# Patient Record
Sex: FEMALE | Race: WHITE | URBAN - METROPOLITAN AREA
[De-identification: names, ages, dates, MRNs, and addresses within clinical notes are randomized per-mention and may not be internally consistent; named-entity substitution may affect disease eponyms.]

---

## 2022-05-12 ENCOUNTER — HOSPITAL ENCOUNTER (OUTPATIENT)
Dept: PHYSICAL THERAPY | Age: 52
Discharge: HOME | End: 2022-05-12
Payer: COMMERCIAL

## 2022-05-12 PROCEDURE — 97161 PT EVAL LOW COMPLEX 20 MIN: CPT | Performed by: PHYSICAL THERAPIST

## 2022-05-12 PROCEDURE — 97535 SELF CARE MNGMENT TRAINING: CPT | Performed by: PHYSICAL THERAPIST

## 2022-05-12 NOTE — PROGRESS NOTES
RICARDO [address], [address] [address],[facility], [facility] - Phone: [phone]  Fax: [phone] / 9881 [facility]  Patient Name: Oliverio Dumont : 1970   Medical   Diagnosis: S/p ankle surgery Treatment Diagnosis: Right ankle pain [M25.571]   Onset Date: 22     Referral Source: Carla Sawant MD [place] [facility]): 2022   Prior Hospitalization: See medical history Provider #: [id number]   Prior Level of Function: Pain with all wb   Comorbidities: Depression, asthma, spondylolisthesis, thyroid issues   Medications: Verified on Patient Summary List   The Plan of Care and following information is based on the information from the initial evaluation.   ===========================================================================================  Assessment / key information:  46 F referred to therapy following lateral stabilization surgery on 22. I did not have access to operative report and have requested it from your office. She had a chronic hx of lateral ankle sprains and opted for surgery. 2 weeks in splint, 3 weeks in cast, 3 weeks in cam boot. Currently in aso brace wbat. Rats pain 0-8/10 increasing with wb. Arom: df knee ext -6, flex -10, pf 44, iv 9, ev 6. Strength reduced in all directions (ev 3-/5, all others grossly 3/5). Neurovascular intact. All other special tests deferred due to operation.  Will attempt PT per protocol.  ===========================================================================================  Eval Complexity: History MEDIUM  Complexity : 1-2 comorbidities / personal factors will impact the outcome/ POC ;  Examination  HIGH Complexity : 4+ Standardized tests and measures addressing body structure, function, activity limitation and / or participation in recreation ; Presentation LOW Complexity : Stable, uncomplicated ;  Decision Making MEDIUM Complexity : FOTO score of 26-74; Overall Complexity LOW   Problem List: pain affecting function, decrease ROM, decrease strength, edema affecting function, impaired gait/ balance, decrease ADL/ functional abilitiies, decrease activity tolerance, decrease flexibility/ joint mobility and decrease transfer abilities   Treatment Plan may include any combination of the following: Therapeutic exercise, Therapeutic activities, Neuromuscular re-education, Physical agent/modality, Gait/balance training, Manual therapy, Patient education, Self Care training, Functional mobility training, Home safety training and Stair training  Patient / Family readiness to learn indicated by: asking questions, trying to perform skills and interest  Persons(s) to be included in education: patient (P)  Barriers to Learning/Limitations: yes; Physical   Measures taken, if barriers to learning: Modify treatment due to back pain   Patient Goal (s): Improve strength/stabiliyty   Patient self reported health status: good  Rehabilitation Potential: good   Short Term Goals: To be accomplished in  2  weeks:  1. Compliant with hep  2. Able to perform sls >/=20 without ev in order to improve stability during standing  3. Note daily max pain </=6/10 in order to increase participation in [address]: To be accomplished in  4  weeks:  1. Able to perform 3x15 eccentric hr in order to improve strength/push off during gait  2. Note daily max pain </=4/10 in order to increase participation in adls  3.  Increase foto by 3 points in order to show functional improvement   Frequency / Duration:   Patient to be seen  2-3  times per week for 4  weeks:  Patient / Caregiver education and instruction: self care, activity modification and brace/ splint application  Therapist Signature: William Birch PT Date: 2/50/0005   Certification Period: na Time: 1:55 PM ===========================================================================================  I certify that the above Physical Therapy Services are being furnished while the patient is under my care. I agree with the treatment plan and certify that this therapy is necessary. Physician Signature:        Date:       Time:                                        Fabrice Genao MD  Please sign and return to [facility] at [facility], [facility]. or you may fax the signed copy to [phone]. Thank you.

## 2022-05-13 ENCOUNTER — HOSPITAL ENCOUNTER (OUTPATIENT)
Dept: PHYSICAL THERAPY | Age: 52
Discharge: HOME | End: 2022-05-13
Payer: COMMERCIAL

## 2022-05-13 PROCEDURE — 97110 THERAPEUTIC EXERCISES: CPT | Performed by: PHYSICAL THERAPIST

## 2022-05-13 PROCEDURE — 97140 MANUAL THERAPY 1/> REGIONS: CPT | Performed by: PHYSICAL THERAPIST

## 2022-05-13 NOTE — PROGRESS NOTES
Belén Calvillo   PHYSICAL THERAPY - DAILY TREATMENT NOTE    Patient Name: Katerine Childers        Date: 2022  : 1970   yes Patient  Verified  Visit #:     Insurance: Payor: BLUE CROSS / Plan: [address] [place] [address] / Product Type: PPO /      In time: 1025 Out time: 1115   Total Treatment Time: 50     Medicare/[facility] Time Tracking (below)   Total Timed Codes (min):  40 1:1 Treatment Time:  40     TREATMENT AREA =  Right ankle pain [M25.571]    SUBJECTIVE  Pain Level (on 0 to 10 scale):  0  / 10   Medication Changes/New allergies or changes in medical history, any new surgeries or procedures?    no  If yes, update Summary List   Subjective Functional Status/Changes:  []  No changes reported     Nothing new since the evaluation           OBJECTIVE  Modalities Rationale:     decrease inflammation, decrease pain and increase tissue extensibility to improve patient's ability to complete adls   min [] Estim, type/location:                                      []  att     []  unatt     []  w/US     []  w/ice    []  w/heat    min []  Mechanical Traction: type/lbs                   []  pro   []  sup   []  int   []  cont    []  before manual    []  after manual    min []  Ultrasound, settings/location:      min []  Iontophoresis w/ dexamethasone, location:                                               []  take home patch       []  in clinic   10 min [x]  Ice     []  Heat    location/position: Supine with bolster     min []  Vasopneumatic Device, press/temp:    If using vaso (only need to measure limb vaso being performed on)      pre-treatment girth :       post-treatment girth :       measured at (landmark location) :      min []  Other:    [] Skin assessment post-treatment (if applicable):    []  intact    []  redness- no adverse reaction                  []redness - adverse reaction:      15 min Therapeutic Exercise:  [x]  See flow sheet   Rationale:      increase ROM, increase strength, improve coordination, improve balance and increase proprioception to improve the patients ability to complete adls     25 min Manual Therapy: Stm/stretch gastroc/soleus, pf, hallux dorsal/plantar glides   Rationale:      decrease pain, increase ROM, increase tissue extensibility, decrease edema  and decrease trigger points to improve patient's ability to complete adls  The manual therapy interventions were performed at a separate and distinct time from the therapeutic activities interventions. Billed With/As:   [] TE   [] TA   [] Neuro   [] Self Care Patient Education: [x] Review HEP    [] Progressed/Changed HEP based on:   [] positioning   [] body mechanics   [] transfers   [] heat/ice application    [] other:      Other Objective/Functional Measures:    Unable to obtain surgical from multiple attempts by pt and therapist  te as per flow sheet with fatigue noted during last 8-10 reps of hr (all performed in shoe outside of brace)  ttp along longitudinal arch     Post Treatment Pain Level (on 0 to 10) scale:   0  / 10     ASSESSMENT  Assessment/Changes in Function:     No increase in pain following initial session but advised on doms      []  See Progress Note/Recertification   Patient will continue to benefit from skilled PT services to modify and progress therapeutic interventions, address functional mobility deficits, address ROM deficits, address strength deficits, analyze and address soft tissue restrictions, analyze and cue movement patterns, analyze and modify body mechanics/ergonomics, assess and modify postural abnormalities, address imbalance/dizziness and instruct in home and community integration to attain remaining goals.    Progress toward goals / Updated goals:    Compliant with footwear modification      PLAN  []  Upgrade activities as tolerated yes Continue plan of care   []  Discharge due to :    []  Other:      Therapist: Vani Montesinos PT    Date: 5/13/2022 Time: 9:23 AM     Future Appointments Date Time Provider Ki Rudd   5/13/2022 10:15 AM Joeanye Recinos, PT [facility]   6/7/2022  1:45 PM Drea Espinosamark [facility]   6/10/2022  9:15 AM Joe Recinos, PT [facility]   6/13/2022 12:00 PM Joe Recinos, PT [facility]   6/15/2022 12:30 PM Joe Passe, PT [facility]   6/20/2022 12:00 PM Joe Grisel, PT [facility]   6/22/2022 12:30 PM Domo Jeffers, PT [address] [id number]

## 2022-05-16 ENCOUNTER — HOSPITAL ENCOUNTER (OUTPATIENT)
Dept: PHYSICAL THERAPY | Age: 52
Discharge: HOME | End: 2022-05-16
Payer: COMMERCIAL

## 2022-05-16 PROCEDURE — 97110 THERAPEUTIC EXERCISES: CPT | Performed by: PHYSICAL THERAPIST

## 2022-05-16 PROCEDURE — 97140 MANUAL THERAPY 1/> REGIONS: CPT | Performed by: PHYSICAL THERAPIST

## 2022-05-16 NOTE — PROGRESS NOTES
Yusuf Herbert   PHYSICAL THERAPY - DAILY TREATMENT NOTE    Patient Name: Shanna Stevens        Date: 2022  : 1970   yes Patient  Verified  Visit #:   3   of   12  Insurance: Payor: Holden Magnoliabobo / Plan: [address] [place] [address] / Product Type: PPO /      In time: 7920 Out time:    Total Treatment Time: 52     Medicare/[employer] Time Tracking (below)   Total Timed Codes (min):  42 1:1 Treatment Time:  42     TREATMENT AREA =  Right ankle pain [M25.571]    SUBJECTIVE  Pain Level (on 0 to 10 scale):  1  / 10   Medication Changes/New allergies or changes in medical history, any new surgeries or procedures?    no  If yes, update Summary List   Subjective Functional Status/Changes:  []  No changes reported     Not too bad after last time I thought that it was going to be painful       OBJECTIVE  Modalities Rationale:     decrease inflammation, decrease pain and increase tissue extensibility to improve patient's ability to complete adls   min [] Estim, type/location:                                      []  att     []  unatt     []  w/US     []  w/ice    []  w/heat    min []  Mechanical Traction: type/lbs                   []  pro   []  sup   []  int   []  cont    []  before manual    []  after manual    min []  Ultrasound, settings/location:      min []  Iontophoresis w/ dexamethasone, location:                                               []  take home patch       []  in clinic   10 min [x]  Ice     []  Heat    location/position: Supine with bolster     min []  Vasopneumatic Device, press/temp:    If using vaso (only need to measure limb vaso being performed on)      pre-treatment girth :       post-treatment girth :       measured at (landmark location) :      min []  Other:    [] Skin assessment post-treatment (if applicable):    []  intact    []  redness- no adverse reaction                  []redness - adverse reaction:      17 min Therapeutic Exercise:  [x]  See flow sheet   Rationale:      increase ROM, increase strength, improve coordination, improve balance and increase proprioception to improve the patients ability to complete adls     25 min Manual Therapy: Stm/stretch gastroc/soleus, pf, hallux dorsal/plantar glides   Rationale:      decrease pain, increase ROM, increase tissue extensibility, decrease edema  and decrease trigger points to improve patient's ability to complete adls  The manual therapy interventions were performed at a separate and distinct time from the therapeutic activities interventions. Billed With/As:   [] TE   [] TA   [] Neuro   [] Self Care Patient Education: [x] Review HEP    [] Progressed/Changed HEP based on:   [] positioning   [] body mechanics   [] transfers   [] heat/ice application    [] other:      Other Objective/Functional Measures:  te as per flow sheet - able to achieve full range with HR with use of UE   stj edema noted but no pain reported with prom df       Post Treatment Pain Level (on 0 to 10) scale:   0  / 10     ASSESSMENT  Assessment/Changes in Function:     No increase in pain following session      []  See Progress Note/Recertification   Patient will continue to benefit from skilled PT services to modify and progress therapeutic interventions, address functional mobility deficits, address ROM deficits, address strength deficits, analyze and address soft tissue restrictions, analyze and cue movement patterns, analyze and modify body mechanics/ergonomics, assess and modify postural abnormalities, address imbalance/dizziness and instruct in home and community integration to attain remaining goals.    Progress toward goals / Updated goals:    Compliant with hep/activity modification     PLAN  []  Upgrade activities as tolerated yes Continue plan of care   []  Discharge due to :    []  Other:      Therapist: Timmy De Anda PT    Date: 5/16/2022 Time: 9:23 AM     Future Appointments   Date Time Provider Ki Rudd   5/16/2022 12:00 PM Matt Jeffers, PT [address] [facility]   5/18/2022 11:00 AM Kei Carmona, PT [facility]   6/7/2022  1:45 PM Nahed Turner [address] [facility]   6/10/2022  9:15 AM Kei Carmona, PT [facility]   6/13/2022 12:00 PM Kei Carmona, PT [facility]   6/15/2022 12:30 PM Kei Carmona,  [address] [facility]   6/20/2022 12:00 PM Kei Carmona, PT [facility]   6/22/2022 12:30 PM Ramon Jeffers, PT [facility] [id number]

## 2022-05-18 ENCOUNTER — HOSPITAL ENCOUNTER (OUTPATIENT)
Dept: PHYSICAL THERAPY | Age: 52
Discharge: HOME | End: 2022-05-18
Payer: COMMERCIAL

## 2022-05-18 PROCEDURE — 97140 MANUAL THERAPY 1/> REGIONS: CPT | Performed by: PHYSICAL THERAPIST

## 2022-05-18 PROCEDURE — 97110 THERAPEUTIC EXERCISES: CPT | Performed by: PHYSICAL THERAPIST

## 2022-05-18 NOTE — PROGRESS NOTES
Ginny Washington   PHYSICAL THERAPY - DAILY TREATMENT NOTE    Patient Name: Jamel Knight        Date: 2022  : 1970   yes Patient  Verified  Visit #:     Insurance: Payor: Jonathan Gonzalez / Plan: [address] [place] [address] / Product Type: PPO /      In time: 1240 Out time: 138   Total Treatment Time: 58     Medicare/[facility] Time Tracking (below)   Total Timed Codes (min):  48 1:1 Treatment Time:  48     TREATMENT AREA =  Right ankle pain [M25.571]    SUBJECTIVE  Pain Level (on 0 to 10 scale):  1  / 10   Medication Changes/New allergies or changes in medical history, any new surgeries or procedures?    no  If yes, update Summary List   Subjective Functional Status/Changes:  []  No changes reported   I got a little soreness on the outside part of my ankle/foot when I was walking yesterday but nothingbad      OBJECTIVE  Modalities Rationale:     decrease inflammation, decrease pain and increase tissue extensibility to improve patient's ability to complete adls   min [] Estim, type/location:                                      []  att     []  unatt     []  w/US     []  w/ice    []  w/heat    min []  Mechanical Traction: type/lbs                   []  pro   []  sup   []  int   []  cont    []  before manual    []  after manual    min []  Ultrasound, settings/location:      min []  Iontophoresis w/ dexamethasone, location:                                               []  take home patch       []  in clinic   10 min [x]  Ice     []  Heat    location/position: Supine with bolster     min []  Vasopneumatic Device, press/temp:    If using vaso (only need to measure limb vaso being performed on)      pre-treatment girth :       post-treatment girth :       measured at (landmark location) :      min []  Other:    [] Skin assessment post-treatment (if applicable):    []  intact    []  redness- no adverse reaction                  []redness - adverse reaction:      23 min Therapeutic Exercise:  [x]  See flow sheet Rationale:      increase ROM, increase strength, improve coordination, improve balance and increase proprioception to improve the patients ability to complete adls     25 min Manual Therapy: Stm/stretch gastroc/soleus, pf, hallux dorsal/plantar glides   Rationale:      decrease pain, increase ROM, increase tissue extensibility, decrease edema  and decrease trigger points to improve patient's ability to complete adls  The manual therapy interventions were performed at a separate and distinct time from the therapeutic activities interventions. Billed With/As:   [] TE   [] TA   [] Neuro   [] Self Care Patient Education: [x] Review HEP    [] Progressed/Changed HEP based on:   [] positioning   [] body mechanics   [] transfers   [] heat/ice application    [] other:      Other Objective/Functional Measures:  Progressed te as per flow sheet - no pain reported with eccentric hr off step but fatigue  ttp with hallux df >25       Post Treatment Pain Level (on 0 to 10) scale:   0  / 10     ASSESSMENT  Assessment/Changes in Function:     Left asymptomatic    []  See Progress Note/Recertification   Patient will continue to benefit from skilled PT services to modify and progress therapeutic interventions, address functional mobility deficits, address ROM deficits, address strength deficits, analyze and address soft tissue restrictions, analyze and cue movement patterns, analyze and modify body mechanics/ergonomics, assess and modify postural abnormalities, address imbalance/dizziness and instruct in home and community integration to attain remaining goals.    Progress toward goals / Updated goals:  Pt to go out of town x1 week will assess compliance to hep/activity modification      PLAN  []  Upgrade activities as tolerated yes Continue plan of care   []  Discharge due to :    []  Other:      Therapist: Regina Ruiz PT    Date: 5/18/2022 Time: 9:23 AM     Future Appointments   Date Time Provider Ki Rudd 5/18/2022 12:30 PM Heladio Jane, PT [facility]   6/7[facility]2022  [facility]   6/10/2022  9:15 AM Heladio [employer], PT [facility]   6/13/2022 12:00 PM Heladio Jane, PT [facility]   6/15/2022 12:30 PM Heladio Jane, PT [facility]   6/20/2022 12:00 PM Heladio Jane, PT [facility]   6/22/2022 12:30 PM Luis Jeffers, PT [facility] [phone]

## 2022-06-01 ENCOUNTER — HOSPITAL ENCOUNTER (OUTPATIENT)
Dept: PHYSICAL THERAPY | Age: 52
Discharge: HOME | End: 2022-06-01
Payer: COMMERCIAL

## 2022-06-01 PROCEDURE — 97140 MANUAL THERAPY 1/> REGIONS: CPT | Performed by: PHYSICAL THERAPIST

## 2022-06-01 PROCEDURE — 97110 THERAPEUTIC EXERCISES: CPT | Performed by: PHYSICAL THERAPIST

## 2022-06-01 NOTE — PROGRESS NOTES
Jaki Barlow PHYSICAL THERAPY - DAILY TREATMENT NOTE    Patient Name: Bryn Rodriguez        Date: 2022  : 1970   yes Patient  Verified  Visit #:     Insurance: Payor: Jon Face / Plan: [address] [place] [address] / Product Type: PPO /      In time: 400 Out time: 452   Total Treatment Time: 52     Medicare/[employer]BS Time Tracking (below)   Total Timed Codes (min):  42 1:1 Treatment Time:  42     TREATMENT AREA =  Right ankle pain [M25.571]    SUBJECTIVE  Pain Level (on 0 to 10 scale):  0/ 10   Medication Changes/New allergies or changes in medical history, any new surgeries or procedures?    no  If yes, update Summary List   Subjective Functional Status/Changes:  []  No changes reported     Just feels stiff.  I did a lot of walking in my brace on sand but it was okay      OBJECTIVE  Modalities Rationale:     decrease inflammation, decrease pain and increase tissue extensibility to improve patient's ability to complete adls   min [] Estim, type/location:                                      []  att     []  unatt     []  w/US     []  w/ice    []  w/heat    min []  Mechanical Traction: type/lbs                   []  pro   []  sup   []  int   []  cont    []  before manual    []  after manual    min []  Ultrasound, settings/location:      min []  Iontophoresis w/ dexamethasone, location:                                               []  take home patch       []  in clinic   10 min [x]  Ice     []  Heat    location/position: Supine with bolster     min []  Vasopneumatic Device, press/temp:    If using vaso (only need to measure limb vaso being performed on)      pre-treatment girth :       post-treatment girth :       measured at (landmark location) :      min []  Other:    [] Skin assessment post-treatment (if applicable):    []  intact    []  redness- no adverse reaction                  []redness - adverse reaction:      17 min Therapeutic Exercise:  [x]  See flow sheet   Rationale:      increase ROM, increase strength, improve coordination, improve balance and increase proprioception to improve the patients ability to complete adls     25 min Manual Therapy: Stm/stretch gastroc/soleus, pf, hallux dorsal/plantar glides   Rationale:      decrease pain, increase ROM, increase tissue extensibility, decrease edema  and decrease trigger points to improve patient's ability to complete adls  The manual therapy interventions were performed at a separate and distinct time from the therapeutic activities interventions. Billed With/As:   [] TE   [] TA   [] Neuro   [] Self Care Patient Education: [x] Review HEP    [] Progressed/Changed HEP based on:   [] positioning   [] body mechanics   [] transfers   [] heat/ice application    [] other:      Other Objective/Functional Measures:  Visible edema along tarsal tunnel but not mid foot  Progressed standing te as per flow sheet without brace - multiple finger tip correction required for balance        Post Treatment Pain Level (on 0 to 10) scale:   0  / 10     ASSESSMENT  Assessment/Changes in Function:   No adverse reactions following session      []  See Progress Note/Recertification   Patient will continue to benefit from skilled PT services to modify and progress therapeutic interventions, address functional mobility deficits, address ROM deficits, address strength deficits, analyze and address soft tissue restrictions, analyze and cue movement patterns, analyze and modify body mechanics/ergonomics, assess and modify postural abnormalities, address imbalance/dizziness and instruct in home and community integration to attain remaining goals.    Progress toward goals / Updated goals:  FOTO tomorrow to assess progress towards functional goals      PLAN  []  Upgrade activities as tolerated yes Continue plan of care   []  Discharge due to :    []  Other:      Therapist: Tali Carrasco PT    Date: 6/1/2022 Time: 9:23 AM     Future Appointments   Date Time Provider Department [place]   6/1/2022  3:45 PM Vale Elliott, PT [facility]   6/7/2022  1:45 PM Gianfrancokevin Villafana [facility]   6/10/2022  9:15 AM Vale Elliott, PT [facility]   6/13/2022 12:00 PM Vale Elliott, PT [facility]   6/15/2022 12:30 PM Vale Elliott, PT [facility]   6/20/2022 12:00 PM Vale Elliott, PT [facility]   6/22/2022 12:30 PM Mare Jeffers, PT [address] [phone]

## 2022-06-02 ENCOUNTER — APPOINTMENT (OUTPATIENT)
Dept: PHYSICAL THERAPY | Age: 52
End: 2022-06-02
Payer: COMMERCIAL

## 2022-06-07 ENCOUNTER — HOSPITAL ENCOUNTER (OUTPATIENT)
Dept: PHYSICAL THERAPY | Age: 52
Discharge: HOME | End: 2022-06-07
Payer: COMMERCIAL

## 2022-06-07 PROCEDURE — 97140 MANUAL THERAPY 1/> REGIONS: CPT | Performed by: PHYSICAL THERAPIST

## 2022-06-07 PROCEDURE — 97110 THERAPEUTIC EXERCISES: CPT | Performed by: PHYSICAL THERAPIST

## 2022-06-07 NOTE — PROGRESS NOTES
[facility] PHYSICAL THERAPY - DAILY TREATMENT NOTE    Patient Name: Edmund Villalobos        Date: 2022  : 1970   yes Patient  Verified  Visit #:     Insurance: Payor: Nikolai Eldridge / Plan: [address] [place] [address] / Product Type: PPO /      In time: 145 Out time: 150   Total Treatment Time: 60     Medicare/BCBS Time Tracking (below)   Total Timed Codes (min):  50 1:1 Treatment Time:  50     TREATMENT AREA =  Right ankle pain [M25.571]    SUBJECTIVE  Pain Level (on 0 to 10 scale):  4/ 10   Medication Changes/New allergies or changes in medical history, any new surgeries or procedures?    no  If yes, update Summary List   Subjective Functional Status/Changes:  []  No changes reported     Saw the doctor and he said it was okay to get out of the brace so I did - after 3 days without it my leg swelled and has been swollen since.  I iced one time but that did not help     OBJECTIVE  Modalities Rationale:     decrease inflammation, decrease pain and increase tissue extensibility to improve patient's ability to complete adls   min [] Estim, type/location:                                      []  att     []  unatt     []  w/US     []  w/ice    []  w/heat    min []  Mechanical Traction: type/lbs                   []  pro   []  sup   []  int   []  cont    []  before manual    []  after manual    min []  Ultrasound, settings/location:      min []  Iontophoresis w/ dexamethasone, location:                                               []  take home patch       []  in clinic   10 min [x]  Ice     []  Heat    location/position: Supine with bolster     min []  Vasopneumatic Device, press/temp:    If using vaso (only need to measure limb vaso being performed on)      pre-treatment girth :       post-treatment girth :       measured at (landmark location) :      min []  Other:    [] Skin assessment post-treatment (if applicable):    []  intact    []  redness- no adverse reaction                  []redness - adverse reaction:      25 min Therapeutic Exercise:  [x]  See flow sheet   Rationale:      increase ROM, increase strength, improve coordination, improve balance and increase proprioception to improve the patients ability to complete adls     25 min Manual Therapy: Stm/stretch gastroc/soleus, pf, hallux dorsal/plantar glides   Rationale:      decrease pain, increase ROM, increase tissue extensibility, decrease edema  and decrease trigger points to improve patient's ability to complete adls  The manual therapy interventions were performed at a separate and distinct time from the therapeutic activities interventions. Billed With/As:   [] TE   [] TA   [] Neuro   [] Self Care Patient Education: [x] Review HEP    [] Progressed/Changed HEP based on:   [] positioning   [] body mechanics   [] transfers   [] heat/ice application    [] other:      Other Objective/Functional Measures:  Arrived to session with increased edema throughout entire foot/ankle complex with stj impingement end range df - this is new. Modified session accordingly        Post Treatment Pain Level (on 0 to 10) scale:   0  / 10     ASSESSMENT  Assessment/Changes in Function:   Left session without pain but did not progress original poc of pres     []  See Progress Note/Recertification   Patient will continue to benefit from skilled PT services to modify and progress therapeutic interventions, address functional mobility deficits, address ROM deficits, address strength deficits, analyze and address soft tissue restrictions, analyze and cue movement patterns, analyze and modify body mechanics/ergonomics, assess and modify postural abnormalities, address imbalance/dizziness and instruct in home and community integration to attain remaining goals.    Progress toward goals / Updated goals:  FOTO decreased but likely due to subjective intake      PLAN  []  Upgrade activities as tolerated yes Continue plan of care   []  Discharge due to :    []  Other: Therapist: Brayan Murdock PT    Date: 6/7/2022 Time: 9:23 AM     Future Appointments   Date Time Provider Ki Rudd   6/7/2022  1:45 PM Lorna Olivares [facility]   6/10/2022  9:15 AM Baljeet Buck, PT [facility]   6/13/2022 12:00 PM Baljeet Buck, PT [facility]   6/15/2022 12:30 PM Baljeet Buck, PT [facility]   6/20/2022 12:00 PM [facility]et Buck [facility]   6/22/2022 12:30 PM Ghazala Jeffers, PT [facility] [phone]

## 2022-06-10 ENCOUNTER — HOSPITAL ENCOUNTER (OUTPATIENT)
Dept: PHYSICAL THERAPY | Age: 52
Discharge: HOME | End: 2022-06-10
Payer: COMMERCIAL

## 2022-06-10 PROCEDURE — 97140 MANUAL THERAPY 1/> REGIONS: CPT | Performed by: PHYSICAL THERAPIST

## 2022-06-10 PROCEDURE — 97110 THERAPEUTIC EXERCISES: CPT | Performed by: PHYSICAL THERAPIST

## 2022-06-10 NOTE — PROGRESS NOTES
[facility]   PHYSICAL THERAPY - DAILY TREATMENT NOTE    Patient Name: Bandar Castrejon        Date: 6/10/2022  : 1970   yes Patient  Verified  Visit #:     Insurance: Payor: Yohana Des / Plan: [address] [place] [address] / Product Type: PPO /      In time: 1006 Out time: 110   Total Treatment Time: 64     Medicare/BCBS Time Tracking (below)   Total Timed Codes (min):  54 1:1 Treatment Time:  54     TREATMENT AREA =  Right ankle pain [M25.571]    SUBJECTIVE  Pain Level (on 0 to 10 scale):  4/ 10   Medication Changes/New allergies or changes in medical history, any new surgeries or procedures?    no  If yes, update Summary List   Subjective Functional Status/Changes:  []  No changes reported   Feels better      OBJECTIVE  Modalities Rationale:     decrease inflammation, decrease pain and increase tissue extensibility to improve patient's ability to complete adls   min [] Estim, type/location:                                      []  att     []  unatt     []  w/US     []  w/ice    []  w/heat    min []  Mechanical Traction: type/lbs                   []  pro   []  sup   []  int   []  cont    []  before manual    []  after manual    min []  Ultrasound, settings/location:      min []  Iontophoresis w/ dexamethasone, location:                                               []  take home patch       []  in clinic   10 min [x]  Ice     []  Heat    location/position: Supine with bolster     min []  Vasopneumatic Device, press/temp:    If using vaso (only need to measure limb vaso being performed on)      pre-treatment girth :       post-treatment girth :       measured at (landmark location) :      min []  Other:    [] Skin assessment post-treatment (if applicable):    []  intact    []  redness- no adverse reaction                  []redness - adverse reaction:      24 min Therapeutic Exercise:  [x]  See flow sheet   Rationale:      increase ROM, increase strength, improve coordination, improve balance and increase proprioception to improve the patients ability to complete adls     20 min Manual Therapy: Stm/stretch gastroc/soleus, pf, hallux dorsal/plantar glides   Rationale:      decrease pain, increase ROM, increase tissue extensibility, decrease edema  and decrease trigger points to improve patient's ability to complete adls  The manual therapy interventions were performed at a separate and distinct time from the therapeutic activities interventions. Billed With/As:   [] TE   [] TA   [] Neuro   [] Self Care Patient Education: [x] Review HEP    [] Progressed/Changed HEP based on:   [] positioning   [] body mechanics   [] transfers   [] heat/ice application    [] other:      Other Objective/Functional Measures:    ttp along anterior stj  Able to perform te as per flow sheet      Post Treatment Pain Level (on 0 to 10) scale:   0  / 10     ASSESSMENT  Assessment/Changes in Function:   No adverse reactions for following session    []  See Progress Note/Recertification   Patient will continue to benefit from skilled PT services to modify and progress therapeutic interventions, address functional mobility deficits, address ROM deficits, address strength deficits, analyze and address soft tissue restrictions, analyze and cue movement patterns, analyze and modify body mechanics/ergonomics, assess and modify postural abnormalities, address imbalance/dizziness and instruct in home and community integration to attain remaining goals.    Progress toward goals / Updated goals  Slowly progressing weaning out of any ankle support      PLAN  []  Upgrade activities as tolerated yes Continue plan of care   []  Discharge due to :    []  Other:      Therapist: Eduard Doty PT    Date: 6/10/2022 Time: 9:23 AM     Future Appointments   Date Time Provider Ki Rudd   6/10/2022 10:00 AM Kristie Rincon  [address] [facility]   6/13/2022 12:00 PM Leah Nazario [facility]   6/15/2022 12:30 PM Kristie Rincon  [address] [facility]   6/20/2022 12:00 PM Justine Mcclendon,  [address] [facility]   6/22/2022 12:30 PM Gary Jeffers, PT [facility]

## 2022-06-13 ENCOUNTER — HOSPITAL ENCOUNTER (OUTPATIENT)
Dept: PHYSICAL THERAPY | Age: 52
Discharge: HOME | End: 2022-06-13
Payer: COMMERCIAL

## 2022-06-13 PROCEDURE — 97110 THERAPEUTIC EXERCISES: CPT | Performed by: PHYSICAL THERAPIST

## 2022-06-13 PROCEDURE — 97140 MANUAL THERAPY 1/> REGIONS: CPT | Performed by: PHYSICAL THERAPIST

## 2022-06-13 NOTE — PROGRESS NOTES
.RAD[facility] THER[facility]  [address] [facility] [address],[facility], [facility] - Phone: [phone]  Fax: [phone]  PROGRESS NOTE  Patient Name: Griselda Krone : 1970   Treatment/Medical Diagnosis: Right ankle pain [M25.571]   Referral Source: Elias Bonilla MD     Date of Initial Visit: 22 Attended Visits: 8 Missed Visits: 2     SUMMARY OF TREATMENT  Pt was seen for IE and 7 follow up visits with treatment consisting of therapeutic exercises, nmr, manual therapy and instruction on hep/activity modification  CURRENT STATUS  Pt has made good progress with PT thus far. She has restored full pain free arom in all directions. She notes some lateral ankle pain at end of day or with walking >/=15minutes '. Some effusion is associated with this but all resolve with rest. We have been slow to progress strengthening based on protocol. Would like to continue therapy an additional month to address strength/stabilization    Goal/Measure of Progress Goal Met? 1.  Able to perform 3x15 eccentric hr in order to improve push off during gait   Status at last Eval: unable Current Status: Double leg  yes   2. Note daily max pain </=4/10 in order to increase participation in adls   Status at last Eval: 10/10 Current Status: 4/10 yes   3. Increase FOTO by 3 points in order to show functional improvement   Status at last Eval: 63/100 Current Status: 60/100 no     New Goals to be achieved in __4__  weeks: 1. Able to perform 3x15 eccentric SINGLE LEG hr in order to improve push off during gait  2. Pt will ambulate >/=30 minutes without progressive pain in order to complete adls such as grocery shopping  3. Achieve goal #3  RECOMMENDATIONS  Continue therapy 2x/4 weeks   If you have any questions/comments please contact us directly at [phone]. Thank you for allowing us to assist in the care of your patient.     Therapist Signature: Wing Glass, PT Date: 2022 Time: 7:55 AM   NOTE TO PHYSICIAN:  PLEASE COMPLETE THE ORDERS BELOW AND FAX TO   [facility]: [phone]  If you are unable to process this request in 24 hours please contact our office: [phone]    ___ I have read the above report and request that my patient continue as recommended.   ___ I have read the above report and request that my patient continue therapy with the following changes/special instructions:_________________________________________________________   ___ I have read the above report and request that my patient be discharged from therapy.      Physician Signature:        Date:       Time:                                 Wilfredo Thompson MD

## 2022-06-13 NOTE — PROGRESS NOTES
Maryanne Saini   PHYSICAL THERAPY - DAILY TREATMENT NOTE    Patient Name: Simeon Garcia        Date: 2022  : 1970   yes Patient  Verified  Visit #:     Insurance: Payor: Aditya Saha / Plan: [address] [place] [address] / Product Type: PPO /      In time: 1210 Out time: 100   Total Treatment Time: 50     Medicare/[facility] Time Tracking (below)   Total Timed Codes (min):  40 1:1 Treatment Time:  40     TREATMENT AREA =  Right ankle pain [M25.571]    SUBJECTIVE  Pain Level (on 0 to 10 scale):  4/ 10   Medication Changes/New allergies or changes in medical history, any new surgeries or procedures?    no  If yes, update Summary List   Subjective Functional Status/Changes:  []  No changes reported   See pn     OBJECTIVE  Modalities Rationale:     decrease inflammation, decrease pain and increase tissue extensibility to improve patient's ability to complete adls   min [] Estim, type/location:                                      []  att     []  unatt     []  w/US     []  w/ice    []  w/heat    min []  Mechanical Traction: type/lbs                   []  pro   []  sup   []  int   []  cont    []  before manual    []  after manual    min []  Ultrasound, settings/location:      min []  Iontophoresis w/ dexamethasone, location:                                               []  take home patch       []  in clinic   10 min [x]  Ice     []  Heat    location/position: Supine with bolster     min []  Vasopneumatic Device, press/temp:    If using vaso (only need to measure limb vaso being performed on)      pre-treatment girth :       post-treatment girth :       measured at (landmark location) :      min []  Other:    [] Skin assessment post-treatment (if applicable):    []  intact    []  redness- no adverse reaction                  []redness - adverse reaction:      25 min Therapeutic Exercise:  [x]  See flow sheet   Rationale:      increase ROM, increase strength, improve coordination, improve balance and increase proprioception to improve the patients ability to complete adls     15 min Manual Therapy: Stm/stretch gastroc/soleus, pf, hallux dorsal/plantar glides   Rationale:      decrease pain, increase ROM, increase tissue extensibility, decrease edema  and decrease trigger points to improve patient's ability to complete adls  The manual therapy interventions were performed at a separate and distinct time from the therapeutic activities interventions. Billed With/As:   [] TE   [] TA   [] Neuro   [] Self Care Patient Education: [x] Review HEP    [] Progressed/Changed HEP based on:   [] positioning   [] body mechanics   [] transfers   [] heat/ice application    [] other:      Other Objective/Functional Measures:    See pn     Post Treatment Pain Level (on 0 to 10) scale:   0  / 10     ASSESSMENT  Assessment/Changes in Function:   See pn   []  See Progress Note/Recertification   Patient will continue to benefit from skilled PT services to modify and progress therapeutic interventions, address functional mobility deficits, address ROM deficits, address strength deficits, analyze and address soft tissue restrictions, analyze and cue movement patterns, analyze and modify body mechanics/ergonomics, assess and modify postural abnormalities, address imbalance/dizziness and instruct in home and community integration to attain remaining goals.    Progress toward goals / Updated goals  See pn     PLAN  []  Upgrade activities as tolerated yes Continue plan of care   []  Discharge due to :    []  Other:      Therapist: Brayan Murdock PT    Date: 6/13/2022 Time: 9:23 AM     Future Appointments   Date Time Provider Ki Rudd   6/13/2022 12:00 PM Baljeet Buck PT [facility]   6/15/2022 12:30 PM Baljeet Buck PT [facility]   6/20/2022 12:00 PM Baljeet Buck PT [facility]   6/22/2022 12:30 PM Ghazala Jeffers, PT [facility]

## 2022-06-15 ENCOUNTER — HOSPITAL ENCOUNTER (OUTPATIENT)
Dept: PHYSICAL THERAPY | Age: 52
Discharge: HOME | End: 2022-06-15
Payer: COMMERCIAL

## 2022-06-15 PROCEDURE — 97140 MANUAL THERAPY 1/> REGIONS: CPT | Performed by: PHYSICAL THERAPIST

## 2022-06-15 PROCEDURE — 97110 THERAPEUTIC EXERCISES: CPT | Performed by: PHYSICAL THERAPIST

## 2022-06-15 NOTE — PROGRESS NOTES
[facility]   PHYSICAL THERAPY - DAILY TREATMENT NOTE    Patient Name: Yolis Omalley        Date: 6/15/2022  : 1970   yes Patient  Verified  Visit #:     Insurance: Payor: Low Starr / Plan: [address] [place] [address] / Product Type: PPO /      In time: 690 Out time: 130   Total Treatment Time: 65     Medicare/BCBS Time Tracking (below)   Total Timed Codes (min):  55 1:1 Treatment Time:  55     TREATMENT AREA =  Right ankle pain [M25.571]    SUBJECTIVE  Pain Level (on 0 to 10 scale):  0/ 10   Medication Changes/New allergies or changes in medical history, any new surgeries or procedures?    no  If yes, update Summary List   Subjective Functional Status/Changes:  []  No changes reported   I am feeling really good      OBJECTIVE  Modalities Rationale:     decrease inflammation, decrease pain and increase tissue extensibility to improve patient's ability to complete adls   min [] Estim, type/location:                                      []  att     []  unatt     []  w/US     []  w/ice    []  w/heat    min []  Mechanical Traction: type/lbs                   []  pro   []  sup   []  int   []  cont    []  before manual    []  after manual    min []  Ultrasound, settings/location:      min []  Iontophoresis w/ dexamethasone, location:                                               []  take home patch       []  in clinic   10 min [x]  Ice     []  Heat    location/position: Supine with bolster     min []  Vasopneumatic Device, press/temp:    If using vaso (only need to measure limb vaso being performed on)      pre-treatment girth :       post-treatment girth :       measured at (landmark location) :      min []  Other:    [] Skin assessment post-treatment (if applicable):    []  intact    []  redness- no adverse reaction                  []redness - adverse reaction:      40 min Therapeutic Exercise:  [x]  See flow sheet   Rationale:      increase ROM, increase strength, improve coordination, improve balance and increase proprioception to improve the patients ability to complete adls     15 min Manual Therapy: Stm/stretch gastroc/soleus, pf, hallux dorsal/plantar glides   Rationale:      decrease pain, increase ROM, increase tissue extensibility, decrease edema  and decrease trigger points to improve patient's ability to complete adls  The manual therapy interventions were performed at a separate and distinct time from the therapeutic activities interventions. Billed With/As:   [] TE   [] TA   [] Neuro   [] Self Care Patient Education: [x] Review HEP    [] Progressed/Changed HEP based on:   [] positioning   [] body mechanics   [] transfers   [] heat/ice application    [] other:      Other Objective/Functional Measures:    Progressed te as per flow sheet - able to come to neutral df with squats without stj discomfort     Post Treatment Pain Level (on 0 to 10) scale:   0  / 10     ASSESSMENT  Assessment/Changes in Function:   No adverse reactions following session    []  See Progress Note/Recertification   Patient will continue to benefit from skilled PT services to modify and progress therapeutic interventions, address functional mobility deficits, address ROM deficits, address strength deficits, analyze and address soft tissue restrictions, analyze and cue movement patterns, analyze and modify body mechanics/ergonomics, assess and modify postural abnormalities, address imbalance/dizziness and instruct in home and community integration to attain remaining goals.    Progress toward goals / Updated goals  1 set of single leg hr - progress towards ltg      PLAN  []  Upgrade activities as tolerated yes Continue plan of care   []  Discharge due to :    []  Other:      Therapist: Lisa Sparks PT    Date: 6/15/2022 Time: 9:23 AM     Future Appointments   Date Time Provider Ki Rudd   6/20/2022 12:00 PM Lakesha Acuna PT [facility]   6/22/2022 12:30 PM Lakesha Acuna PT [facility]

## 2022-06-20 ENCOUNTER — HOSPITAL ENCOUNTER (OUTPATIENT)
Dept: PHYSICAL THERAPY | Age: 52
Discharge: HOME | End: 2022-06-20
Payer: COMMERCIAL

## 2022-06-20 PROCEDURE — 97110 THERAPEUTIC EXERCISES: CPT | Performed by: PHYSICAL THERAPIST

## 2022-06-20 PROCEDURE — 97140 MANUAL THERAPY 1/> REGIONS: CPT | Performed by: PHYSICAL THERAPIST

## 2022-06-20 NOTE — PROGRESS NOTES
[facility] PHYSICAL THERAPY - DAILY TREATMENT NOTE    Patient Name: Kelly Joy        Date: 2022  : 1970   yes Patient  Verified  Visit #:   10   of   12  Insurance: Payor: Damián  / Plan: [address] [place] [address] / Product Type: PPO /      In time: 8840 Out time: 115   Total Treatment Time: 70     Medicare/[facility] Time Tracking (below)   Total Timed Codes (min):  60 1:1 Treatment Time:  60     TREATMENT AREA =  Right ankle pain [M25.571]    SUBJECTIVE  Pain Level (on 0 to 10 scale):  1/ 10   Medication Changes/New allergies or changes in medical history, any new surgeries or procedures?    no  If yes, update Summary List   Subjective Functional Status/Changes:  []  No changes reported       I was on my feet all weekend - riding bikes and [facility]       OBJECTIVE  Modalities Rationale:     decrease inflammation, decrease pain and increase tissue extensibility to improve patient's ability to complete adls   min [] Estim, type/location:                                      []  att     []  unatt     []  w/US     []  w/ice    []  w/heat    min []  Mechanical Traction: type/lbs                   []  pro   []  sup   []  int   []  cont    []  before manual    []  after manual    min []  Ultrasound, settings/location:      min []  Iontophoresis w/ dexamethasone, location:                                               []  take home patch       []  in clinic   10 min [x]  Ice     []  Heat    location/position: Supine with bolster     min []  Vasopneumatic Device, press/temp:    If using vaso (only need to measure limb vaso being performed on)      pre-treatment girth :       post-treatment girth :       measured at (landmark location) :      min []  Other:    [] Skin assessment post-treatment (if applicable):    []  intact    []  redness- no adverse reaction                  []redness - adverse reaction:      35 min Therapeutic Exercise:  [x]  See flow sheet   Rationale: increase ROM, increase strength, improve coordination, improve balance and increase proprioception to improve the patients ability to complete adls     25 min Manual Therapy: Stm/stretch gastroc/soleus, pf, hallux dorsal/plantar glides   Rationale:      decrease pain, increase ROM, increase tissue extensibility, decrease edema  and decrease trigger points to improve patient's ability to complete adls  The manual therapy interventions were performed at a separate and distinct time from the therapeutic activities interventions. Billed With/As:   [] TE   [] TA   [] Neuro   [] Self Care Patient Education: [x] Review HEP    [] Progressed/Changed HEP based on:   [] positioning   [] body mechanics   [] transfers   [] heat/ice application    [] other:      Other Objective/Functional Measures:    Significant tenderness along medial achilles and stj compared to previous session, functional df limiting 6\" step downs/stairs likely due to swelling  te as per flow sheet    Post Treatment Pain Level (on 0 to 10) scale:   0  / 10     ASSESSMENT  Assessment/Changes in Function:   Effusion limited full participation in program    []  See Progress Note/Recertification   Patient will continue to benefit from skilled PT services to modify and progress therapeutic interventions, address functional mobility deficits, address ROM deficits, address strength deficits, analyze and address soft tissue restrictions, analyze and cue movement patterns, analyze and modify body mechanics/ergonomics, assess and modify postural abnormalities, address imbalance/dizziness and instruct in home and community integration to attain remaining goals.    Progress toward goals / Updated goals  Prolonged walking especially on uneven surfaces increasing pain     PLAN  []  Upgrade activities as tolerated yes Continue plan of care   []  Discharge due to :    []  Other:      Therapist: Dante Fernandes PT    Date: 6/20/2022 Time: 9:23 AM     Future Appointments   Date Time Provider Ki Rudd   6/20/2022 12:00 PM Bridget Ryder [address] [facility]   6/22/2022 12:30 PM Israel Jeffers, PT [facility] [phone]

## 2022-06-22 ENCOUNTER — HOSPITAL ENCOUNTER (OUTPATIENT)
Dept: PHYSICAL THERAPY | Age: 52
Discharge: HOME | End: 2022-06-22
Payer: COMMERCIAL

## 2022-06-22 PROCEDURE — 97110 THERAPEUTIC EXERCISES: CPT | Performed by: PHYSICAL THERAPIST

## 2022-06-22 PROCEDURE — 97140 MANUAL THERAPY 1/> REGIONS: CPT | Performed by: PHYSICAL THERAPIST

## 2022-06-22 NOTE — PROGRESS NOTES
Marquis Blackburn   PHYSICAL THERAPY - DAILY TREATMENT NOTE    Patient Name: Bandar Castrejon        Date: 2022  : 1970   yes Patient  Verified  Visit #:     Insurance: Payor: BLUE CROSS / Plan: [address] [place] [address] / Product Type: PPO /      In time: 1100 Out time: 1158   Total Treatment Time: 58     Medicare/BCBS Time Tracking (below)   Total Timed Codes (min):  48 1:1 Treatment Time:  48     TREATMENT AREA =  Right ankle pain [M25.571]    SUBJECTIVE  Pain Level (on 0 to 10 scale):  0/ 10   Medication Changes/New allergies or changes in medical history, any new surgeries or procedures?    no  If yes, update Summary List   Subjective Functional Status/Changes:  []  No changes reported   I am feeling better than the other day      OBJECTIVE  Modalities Rationale:     decrease inflammation, decrease pain and increase tissue extensibility to improve patient's ability to complete adls   min [] Estim, type/location:                                      []  att     []  unatt     []  w/US     []  w/ice    []  w/heat    min []  Mechanical Traction: type/lbs                   []  pro   []  sup   []  int   []  cont    []  before manual    []  after manual    min []  Ultrasound, settings/location:      min []  Iontophoresis w/ dexamethasone, location:                                               []  take home patch       []  in clinic   10 min [x]  Ice     []  Heat    location/position: Supine with bolster     min []  Vasopneumatic Device, press/temp:    If using vaso (only need to measure limb vaso being performed on)      pre-treatment girth :       post-treatment girth :       measured at (landmark location) :      min []  Other:    [] Skin assessment post-treatment (if applicable):    []  intact    []  redness- no adverse reaction                  []redness - adverse reaction:      33 min Therapeutic Exercise:  [x]  See flow sheet   Rationale:      increase ROM, increase strength, improve coordination, improve balance and increase proprioception to improve the patients ability to complete adls     15 min Manual Therapy: Stm/stretch gastroc/soleus, pf, hallux dorsal/plantar glides   Rationale:      decrease pain, increase ROM, increase tissue extensibility, decrease edema  and decrease trigger points to improve patient's ability to complete adls  The manual therapy interventions were performed at a separate and distinct time from the therapeutic activities interventions. Billed With/As:   [] TE   [] TA   [] Neuro   [] Self Care Patient Education: [x] Review HEP    [] Progressed/Changed HEP based on:   [] positioning   [] body mechanics   [] transfers   [] heat/ice application    [] other:      Other Objective/Functional Measures:  Reduced pain, tenderness and stj impingement compared to previous session  Progressed te as per flow sheet - fatigued quickly with sl hr and required UE a to achieved    Post Treatment Pain Level (on 0 to 10) scale:   0  / 10     ASSESSMENT  Assessment/Changes in Function:   No increas ein pain following session    []  See Progress Note/Recertification   Patient will continue to benefit from skilled PT services to modify and progress therapeutic interventions, address functional mobility deficits, address ROM deficits, address strength deficits, analyze and address soft tissue restrictions, analyze and cue movement patterns, analyze and modify body mechanics/ergonomics, assess and modify postural abnormalities, address imbalance/dizziness and instruct in home and community integration to attain remaining goals.    Progress toward goals / Updated goals  Progress with hr goal      PLAN  []  Upgrade activities as tolerated yes Continue plan of care   []  Discharge due to :    []  Other:      Therapist: Megan Pinto PT    Date: 6/22/2022 Time: 9:23 AM     Future Appointments   Date Time Provider Ki Rudd   7/1/2022  7:45 AM Heladio Jane PT [facility]   7/6/2022  3:00 PM Robert Moulton, [place] [address] [facility]   7/8/2022  7:00 AM Robert Moulton, PT [facility]   7/13/2022  3:00 PM Sylvester Mesa [address] [facility]   7/15/2022 10:00 AM Robert Moulton, PT [facility]   7/20/2022  2:15 PM Robert Moulton,  [address] [facility]   7/22/2022 10:00 AM Robert Moulton,  [address] [facility]   7/27/2022 12:30 PM Frank Jeffers, PT [facility]   7/29/2022 10:00 AM Glenna Jeffers, PT [address]

## 2022-06-28 ENCOUNTER — HOSPITAL ENCOUNTER (OUTPATIENT)
Dept: PHYSICAL THERAPY | Age: 52
Discharge: HOME | End: 2022-06-28
Payer: COMMERCIAL

## 2022-06-28 PROCEDURE — 97140 MANUAL THERAPY 1/> REGIONS: CPT | Performed by: PHYSICAL THERAPIST

## 2022-06-28 NOTE — PROGRESS NOTES
Dontae Brown   PHYSICAL THERAPY - DAILY TREATMENT NOTE    Patient Name: Mireya Moore        Date: 2022  : 1970   yes Patient  Verified  Visit #:     Insurance: Payor: Philip Rizzo / Plan: [address] [place] [address] / Product Type: PPO /      In time: 840 Out time: 930   Total Treatment Time: 50     Medicare/[facility] Time Tracking (below)   Total Timed Codes (min):  40 1:1 Treatment Time:  40     TREATMENT AREA =  Right ankle pain [M25.571]    SUBJECTIVE  Pain Level (on 0 to 10 scale):  4/ 10   Medication Changes/New allergies or changes in medical history, any new surgeries or procedures?    no  If yes, update Summary List   Subjective Functional Status/Changes:  []  No changes reported   Feel a little swollen I was on my feet a lot      OBJECTIVE  Modalities Rationale:     decrease inflammation, decrease pain and increase tissue extensibility to improve patient's ability to complete adls   min [] Estim, type/location:                                      []  att     []  unatt     []  w/US     []  w/ice    []  w/heat    min []  Mechanical Traction: type/lbs                   []  pro   []  sup   []  int   []  cont    []  before manual    []  after manual    min []  Ultrasound, settings/location:      min []  Iontophoresis w/ dexamethasone, location:                                               []  take home patch       []  in clinic   10 min [x]  Ice     []  Heat    location/position: Supine with bolster     min []  Vasopneumatic Device, press/temp:    If using vaso (only need to measure limb vaso being performed on)      pre-treatment girth :       post-treatment girth :       measured at (landmark location) :      min []  Other:    [] Skin assessment post-treatment (if applicable):    []  intact    []  redness- no adverse reaction                  []redness - adverse reaction:          40 min Manual Therapy: Biomechanical assessment, retro massage Stm/stretch gastroc/soleus, pf, hallux dorsal/plantar glides   Rationale:      decrease pain, increase ROM, increase tissue extensibility, decrease edema  and decrease trigger points to improve patient's ability to complete adls  The manual therapy interventions were performed at a separate and distinct time from the therapeutic activities interventions. Billed With/As:   [] TE   [] TA   [] Neuro   [] Self Care Patient Education: [x] Review HEP    [] Progressed/Changed HEP based on:   [] positioning   [] body mechanics   [] transfers   [] heat/ice application    [] other:      Other Objective/Functional Measures:  Pt arrived to scheduled session with increased edema throughout entire foot/ankle complex - +stj impingement, +tarsal tunnel edema, +talar tilt test. Pt advised to return to [facility] 24-48 hours to reduce edema held on exercises accordingly    Post Treatment Pain Level (on 0 to 10) scale:   0  / 10     ASSESSMENT  Assessment/Changes in Function:   Reduced pain but not edema following session    []  See Progress Note/Recertification   Patient will continue to benefit from skilled PT services to modify and progress therapeutic interventions, address functional mobility deficits, address ROM deficits, address strength deficits, analyze and address soft tissue restrictions, analyze and cue movement patterns, analyze and modify body mechanics/ergonomics, assess and modify postural abnormalities, address imbalance/dizziness and instruct in home and community integration to attain remaining goals.    Progress toward goals / Updated goals  No change from previous session      PLAN  []  Upgrade activities as tolerated yes Continue plan of care   []  Discharge due to :    []  Other:      Therapist: Rogelio Beckham PT    Date: 6/28/2022 Time: 9:23 AM     Future Appointments   Date Time Provider Ki Rudd   6/28/2022  8:45 AM Siria Crystal,  [address] [facility]   7/1/2022  8:30 AM Siria Crystal  [address] [facility]   7/6/2022  3:00 PM Ayad Jeffers, PT [facility]   7/8/2022  7:00 AM Jone Vera, PT [facility]   7/13/2022  3:00 PM Jone Vera, PT [facility]   7/15/2022 10:00 AM Jone Vera, PT [facility]   7/20/2022  2:15 PM Jone Vera, PT [facility]   7/22/2022 10:00 AM Jone Vera, PT [facility]   7/27/2022 12:30 PM [facility]   7/29/2022 10:00 AM Glenna Jeffers, PT [address]

## 2022-07-01 ENCOUNTER — HOSPITAL ENCOUNTER (OUTPATIENT)
Dept: PHYSICAL THERAPY | Age: 52
Discharge: HOME | End: 2022-07-01
Payer: COMMERCIAL

## 2022-07-01 ENCOUNTER — APPOINTMENT (OUTPATIENT)
Dept: PHYSICAL THERAPY | Age: 52
End: 2022-07-01
Payer: COMMERCIAL

## 2022-07-01 PROCEDURE — 97110 THERAPEUTIC EXERCISES: CPT | Performed by: PHYSICAL THERAPIST

## 2022-07-01 PROCEDURE — 97140 MANUAL THERAPY 1/> REGIONS: CPT | Performed by: PHYSICAL THERAPIST

## 2022-07-01 NOTE — PROGRESS NOTES
Nataliya Burns   PHYSICAL THERAPY - DAILY TREATMENT NOTE    Patient Name: Lynn Head        Date: 2022  : 1970   yes Patient  Verified  Visit #:   15   of   18  Insurance: Payor: Jaime Roman / Plan: [address] [place] [address] / Product Type: PPO /      In time: 837 Out time: 940   Total Treatment Time: 58     Medicare/BCBS Time Tracking (below)   Total Timed Codes (min):  48 1:1 Treatment Time:  48     TREATMENT AREA =  Right ankle pain [M25.571]    SUBJECTIVE  Pain Level (on 0 to 10 scale):  0/ 10   Medication Changes/New allergies or changes in medical history, any new surgeries or procedures?    no  If yes, update Summary List   Subjective Functional Status/Changes:  []  No changes reported   Feels much better than it did the other day but my toes still itch     OBJECTIVE  Modalities Rationale:     decrease inflammation, decrease pain and increase tissue extensibility to improve patient's ability to complete adls   min [] Estim, type/location:                                      []  att     []  unatt     []  w/US     []  w/ice    []  w/heat    min []  Mechanical Traction: type/lbs                   []  pro   []  sup   []  int   []  cont    []  before manual    []  after manual    min []  Ultrasound, settings/location:      min []  Iontophoresis w/ dexamethasone, location:                                               []  take home patch       []  in clinic   10 min [x]  Ice     []  Heat    location/position: Supine with bolster     min []  Vasopneumatic Device, press/temp:    If using vaso (only need to measure limb vaso being performed on)      pre-treatment girth :       post-treatment girth :       measured at (landmark location) :      min []  Other:    [] Skin assessment post-treatment (if applicable):    []  intact    []  redness- no adverse reaction                  []redness - adverse reaction:      33 min Therapeutic Exercise:  [x]  See flow sheet   Rationale:      increase ROM, increase strength, improve coordination, improve balance and increase proprioception to improve the patients ability to complete adls     15 min Manual Therapy: Stm/stretch gastroc/soleus, pf, hallux dorsal/plantar glides   Rationale:      decrease pain, increase ROM, increase tissue extensibility, decrease edema  and decrease trigger points to improve patient's ability to complete adls  The manual therapy interventions were performed at a separate and distinct time from the therapeutic activities interventions. Billed With/As:   [] TE   [] TA   [] Neuro   [] Self Care Patient Education: [x] Review HEP    [] Progressed/Changed HEP based on:   [] positioning   [] body mechanics   [] transfers   [] heat/ice application    [] other:      Other Objective/Functional Measures:  Reduced edema and pain along dorsum of foot and base of fifth met, ttp along 4th/5th met  te as per flow sheet - fatigue last 5 reps of hr   Post Treatment Pain Level (on 0 to 10) scale:   0  / 10     ASSESSMENT  Assessment/Changes in Function:   No adverse reactions following session    []  See Progress Note/Recertification   Patient will continue to benefit from skilled PT services to modify and progress therapeutic interventions, address functional mobility deficits, address ROM deficits, address strength deficits, analyze and address soft tissue restrictions, analyze and cue movement patterns, analyze and modify body mechanics/ergonomics, assess and modify postural abnormalities, address imbalance/dizziness and instruct in home and community integration to attain remaining goals.    Progress toward goals / Updated goals  Progressing with hr goal up to 10 reps     PLAN  []  Upgrade activities as tolerated yes Continue plan of care   []  Discharge due to :    []  Other:      Therapist: Kanu Gibbs, PT    Date: 7/1/2022 Time: 9:23 AM     Future Appointments   Date Time Provider Ki Rudd   7/1/2022  8:30 AM Zach Jeffers, PT [address] 7/6/2022  3:00 PM Makeda Bonds,  [facility]   7/8/2022  7:00 AM Makeda Bonds, PT [facility]   7/13/2022  3:00 PM Makeda Bonds,  [address] [facility]   7/15/2022 10:00 AM Makeda Bonds, PT [facility]   7/20/2022  2:15 PM Makeda Bonds,  [address] [facility]   7/22/2022 10:00 AM Makeda Bonds,  [address] [facility]   7/27/2022 12:30 PM [facility]   7/29/2022 10:00 AM Glenna Jeffers, PT [address]

## 2022-07-06 ENCOUNTER — HOSPITAL ENCOUNTER (OUTPATIENT)
Dept: PHYSICAL THERAPY | Age: 52
Discharge: HOME | End: 2022-07-06
Payer: COMMERCIAL

## 2022-07-06 PROCEDURE — 97140 MANUAL THERAPY 1/> REGIONS: CPT | Performed by: PHYSICAL THERAPIST

## 2022-07-06 PROCEDURE — 97110 THERAPEUTIC EXERCISES: CPT | Performed by: PHYSICAL THERAPIST

## 2022-07-06 NOTE — PROGRESS NOTES
[facility] [address] THERAPY   [facility], [place]  [address],[place] Ruiz, [facility] - Phone: [phone]  Fax: [phone] FOR USE OF DRY NEEDLING/INTRAMUSCULAR MANUAL THERAPY  Patient Name: Jayla Mathews : 1970   Treatment/Medical Diagnosis: Right ankle pain [M25.571]   Referral Source: Yael Jordan MD     Date of Initial Visit: 22 Attended Visits: 14 Missed Visits: 0     Based on findings from the physical therapy examination and evaluation, the evaluating therapist believes the patient, Jayla Mathews would benefit from including Dry Needling as part of the plan of care. Dry needling is a treatment technique utilized in conjunction with other PT interventions to inactivate myofascial trigger points and the pain and dysfunction they cause. Dry Needling is an advanced procedure that requires additional training of intensive course work. PROCEDURE:          -  Solid filament sterile needle (typically 0.3mm/30 gauge) inserted into a trigger point          -  Repeated movements inactivate the trigger points, taking 30-60 seconds per site  Typically consists of 1 dry needling session per week and a possible second treatment including muscle re-education, flexibility, strengthening and other manual techniques to facilitate the benefits of dry needling  BENEFITS:   Inactivation of trigger points   Decreased pain   Increased muscle length   Improved movement patterns   Restoration of function POTENTIAL RISKS:   Post-needling soreness   Infection   Bruising/bleeding   Penetration of a nerve   Pneumothorax  All treating PTs have been thoroughly educated in avoiding adverse reactions   If you agree with this recommendation, please sign the attached prescription form and fax it to us at [phone].   If you have questions or concerns regarding dry needling or any other treatment we may be providing, please contact us at [phone]. Thank you for allowing us to assist in the care of your patient. Therapist Signature: Yanelis Gayle PT Date: 7/6/2022     Time: 5:35 PM   NOTE TO PHYSICIAN:  PLEASE COMPLETE THE ORDERS BELOW AND FAX TO [facility]: [phone]  If you are unable to process this request in 24 hours please contact our office: [phone]  Check box     I have read the above request and AGREE to the recommendation of including dry needling as part of the plan of care. I have read the above request and DO NOT AGREE to including dry needling as part of the plan of care.     I have read the above report and request that my patient continue therapy with the following changes/special instructions: _________________________________________________     Physician Signature:        Date:       Time:                              Boom Hylton MD

## 2022-07-06 NOTE — PROGRESS NOTES
Raheel Baird   PHYSICAL THERAPY - DAILY TREATMENT NOTE    Patient Name: Ivelisse Acevedo        Date: 2022  : 1970   yes Patient  Verified  Visit #:   15   of   18  Insurance: Payor: Niels Overall / Plan: [address] [place] [address] / Product Type: PPO /      In time: 315 Out time: 430   Total Treatment Time: 65     Medicare/BCBS Time Tracking (below)   Total Timed Codes (min):  55 1:1 Treatment Time:  55     TREATMENT AREA =  Right ankle pain [M25.571]    SUBJECTIVE  Pain Level (on 0 to 10 scale):  0/ 10   Medication Changes/New allergies or changes in medical history, any new surgeries or procedures?    no  If yes, update Summary List   Subjective Functional Status/Changes:  []  No changes reported   Calf hurt about 2 days after the las titme     OBJECTIVE  Modalities Rationale:     decrease inflammation, decrease pain and increase tissue extensibility to improve patient's ability to complete adls   min [] Estim, type/location:                                      []  att     []  unatt     []  w/US     []  w/ice    []  w/heat    min []  Mechanical Traction: type/lbs                   []  pro   []  sup   []  int   []  cont    []  before manual    []  after manual    min []  Ultrasound, settings/location:      min []  Iontophoresis w/ dexamethasone, location:                                               []  take home patch       []  in clinic   10 min [x]  Ice     []  Heat    location/position: Supine with bolster     min []  Vasopneumatic Device, press/temp:    If using vaso (only need to measure limb vaso being performed on)      pre-treatment girth :       post-treatment girth :       measured at (landmark location) :      min []  Other:    [] Skin assessment post-treatment (if applicable):    []  intact    []  redness- no adverse reaction                  []redness - adverse reaction:      30 min Therapeutic Exercise:  [x]  See flow sheet   Rationale:      increase ROM, increase strength, improve coordination, improve balance and increase proprioception to improve the patients ability to complete adls     25 min Manual Therapy: Biomechanical assessment Stm/stretch gastroc/soleus, pf, hallux dorsal/plantar glides   Rationale:      decrease pain, increase ROM, increase tissue extensibility, decrease edema  and decrease trigger points to improve patient's ability to complete adls  The manual therapy interventions were performed at a separate and distinct time from the therapeutic activities interventions. Billed With/As:   [] TE   [] TA   [] Neuro   [] Self Care Patient Education: [x] Review HEP    [] Progressed/Changed HEP based on:   [] positioning   [] body mechanics   [] transfers   [] heat/ice application    [] other:      Other Objective/Functional Measures:  Lumbar spine assessment: ext and l rotation 50% loss (-) slump/slr, L hip ir rom reduced, ttp along lumbosacral junction, B lower paraspinals,   te as per flow sheet    Post Treatment Pain Level (on 0 to 10) scale:   0  / 10     ASSESSMENT  Assessment/Changes in Function:   No adverse reactions following session   []  See Progress Note/Recertification   Patient will continue to benefit from skilled PT services to modify and progress therapeutic interventions, address functional mobility deficits, address ROM deficits, address strength deficits, analyze and address soft tissue restrictions, analyze and cue movement patterns, analyze and modify body mechanics/ergonomics, assess and modify postural abnormalities, address imbalance/dizziness and instruct in home and community integration to attain remaining goals.    Progress toward goals / Updated goals  Significant lumbar instability detected contributing to current functional mobility issues      PLAN  []  Upgrade activities as tolerated yes Continue plan of care   []  Discharge due to :    []  Other:      Therapist: Dana Connolly PT    Date: 7/6/2022 Time: 9:23 AM     Future Appointments   Date Time Provider Ki Rudd   7/6/2022  3:00 PM Nahed Henning [address] [facility]   7/8/2022  7:00 AM [facility]   7/13/2022  3:00 PM Nahed Henning [address] [facility]   7/15/2022 10:00 AM [facility]   7/20/2022  2:15 PM Attila Kc,  [address] [facility]   7/22/2022 10:00 AM Attila Kc,  [address] [facility]   7/27/2022 12:30 PM Brandee Jeffers, PT [facility]   7/29/2022 10:00 AM Glenna Jeffers, PT [address]

## 2022-07-08 ENCOUNTER — HOSPITAL ENCOUNTER (OUTPATIENT)
Dept: PHYSICAL THERAPY | Age: 52
Discharge: HOME | End: 2022-07-08
Payer: COMMERCIAL

## 2022-07-08 PROCEDURE — 97110 THERAPEUTIC EXERCISES: CPT | Performed by: PHYSICAL THERAPIST

## 2022-07-08 PROCEDURE — 97140 MANUAL THERAPY 1/> REGIONS: CPT | Performed by: PHYSICAL THERAPIST

## 2022-07-08 NOTE — PROGRESS NOTES
Briana Hernandez   PHYSICAL THERAPY - DAILY TREATMENT NOTE    Patient Name: Danica Lomas        Date: 2022  : 1970   yes Patient  Verified  Visit #:     Insurance: Payor: BLUE [employer]OSS / Plan: [address] [place] [address] / Product Type: PPO /      In time: 720 Out time: 810   Total Treatment Time: 50     Medicare/[employer] Time Tracking (below)   Total Timed Codes (min):  40 1:1 Treatment Time:  40     TREATMENT AREA =  Right ankle pain [M25.571]    SUBJECTIVE  Pain Level (on 0 to 10 scale):  0/ 10   Medication Changes/New allergies or changes in medical history, any new surgeries or procedures?    no  If yes, update Summary List   Subjective Functional Status/Changes:  []  No changes reported   Just sore in my calf area      OBJECTIVE  Modalities Rationale:     decrease inflammation, decrease pain and increase tissue extensibility to improve patient's ability to complete adls   min [] Estim, type/location:                                      []  att     []  unatt     []  w/US     []  w/ice    []  w/heat    min []  Mechanical Traction: type/lbs                   []  pro   []  sup   []  int   []  cont    []  before manual    []  after manual    min []  Ultrasound, settings/location:      min []  Iontophoresis w/ dexamethasone, location:                                               []  take home patch       []  in clinic   10 min [x]  Ice     []  Heat    location/position: Supine with bolster     min []  Vasopneumatic Device, press/temp:    If using vaso (only need to measure limb vaso being performed on)      pre-treatment girth :       post-treatment girth :       measured at (landmark location) :      min []  Other:    [] Skin assessment post-treatment (if applicable):    []  intact    []  redness- no adverse reaction                  []redness - adverse reaction:      25 min Therapeutic Exercise:  [x]  See flow sheet   Rationale:      increase ROM, increase strength, improve coordination, improve balance and increase proprioception to improve the patients ability to complete adls     15 min Manual Therapy:  Stm/stretch gastroc/soleus, pf, hallux dorsal/plantar glides   Rationale:      decrease pain, increase ROM, increase tissue extensibility, decrease edema  and decrease trigger points to improve patient's ability to complete adls  The manual therapy interventions were performed at a separate and distinct time from the therapeutic activities interventions. Billed With/As:   [] TE   [] TA   [] Neuro   [] Self Care Patient Education: [x] Review HEP    [] Progressed/Changed HEP based on:   [] positioning   [] body mechanics   [] transfers   [] heat/ice application    [] other:      Other Objective/Functional Measures:  Modified te due to time constraint  Required cues for all new te to ensure form   Post Treatment Pain Level (on 0 to 10) scale:   0  / 10     ASSESSMENT  Assessment/Changes in Function:   No adverse reactions following session   []  See Progress Note/Recertification   Patient will continue to benefit from skilled PT services to modify and progress therapeutic interventions, address functional mobility deficits, address ROM deficits, address strength deficits, analyze and address soft tissue restrictions, analyze and cue movement patterns, analyze and modify body mechanics/ergonomics, assess and modify postural abnormalities, address imbalance/dizziness and instruct in home and community integration to attain remaining goals.    Progress toward goals / Updated goals  Significant lumbar instability detected contributing to current functional mobility issues      PLAN  []  Upgrade activities as tolerated yes Continue plan of care   []  Discharge due to :    []  Other:      Therapist: Samuel Catalan PT    Date: 7/8/2022 Time: 9:23 AM     Future Appointments   Date Time Provider Ki Rudd   7/13/2022  3:00 PM Courtney Gamble PT [facility]   7/15/2022 10:00 AM Floyd Jeffers, PT [id number] 7/20/2022  2:15 PM Jarad Gonzalez, PT [facility]   7/22/2022 10:00 AM Jarad Gonzalez, PT [facility]   7/27/2022 12:30 PM Serg Jeffers, PT [facility]   7/29/2022 10:00 AM Glenna Jeffers, PT [facility]

## 2022-07-12 ENCOUNTER — APPOINTMENT (OUTPATIENT)
Dept: PHYSICAL THERAPY | Age: 52
End: 2022-07-12
Payer: COMMERCIAL

## 2022-07-13 ENCOUNTER — HOSPITAL ENCOUNTER (OUTPATIENT)
Dept: PHYSICAL THERAPY | Age: 52
Discharge: HOME | End: 2022-07-13
Payer: COMMERCIAL

## 2022-07-13 PROCEDURE — 97140 MANUAL THERAPY 1/> REGIONS: CPT | Performed by: PHYSICAL THERAPIST

## 2022-07-13 PROCEDURE — 97110 THERAPEUTIC EXERCISES: CPT | Performed by: PHYSICAL THERAPIST

## 2022-07-13 NOTE — PROGRESS NOTES
Messi Selby PHYSICAL THERAPY - DAILY TREATMENT NOTE    Patient Name: [facility]        Date: 2022  : 1970   yes Patient  Verified  Visit #:     Insurance: Payor: Fayetta Hamman / Plan: [address] [place] [address] / Product Type: PPO /      In time: 1151 Out time: 7811   Total Treatment Time: 52     Medicare/[facility] Time Tracking (below)   Total Timed Codes (min):  52 1:1 Treatment Time:  52     TREATMENT AREA =  Right ankle pain [M25.571]    SUBJECTIVE  Pain Level (on 0 to 10 scale):  0/ 10   Medication Changes/New allergies or changes in medical history, any new surgeries or procedures?    no  If yes, update Summary List   Subjective Functional Status/Changes:  []  No changes reported   Walked a mile this morning and I feel some discomfort in the front part      OBJECTIVE    37 min Therapeutic Exercise:  [x]  See flow sheet   Rationale:      increase ROM, increase strength, improve coordination, improve balance and increase proprioception to improve the patients ability to complete adls     15 min Manual Therapy:  Stm/stretch gastroc/soleus, pf, hallux dorsal/plantar glides   Rationale:      decrease pain, increase ROM, increase tissue extensibility, decrease edema  and decrease trigger points to improve patient's ability to complete adls  The manual therapy interventions were performed at a separate and distinct time from the therapeutic activities interventions.       Billed With/As:   [] TE   [] TA   [] Neuro   [] Self Care Patient Education: [x] Review HEP    [] Progressed/Changed HEP based on:   [] positioning   [] body mechanics   [] transfers   [] heat/ice application    [] other:      Other Objective/Functional Measures:  See pn   Post Treatment Pain Level (on 0 to 10) scale:   0  / 10     ASSESSMENT  Assessment/Changes in Function:   See pn   []  See Progress Note/Recertification   Patient will continue to benefit from skilled PT services to modify and progress therapeutic interventions, address functional mobility deficits, address ROM deficits, address strength deficits, analyze and address soft tissue restrictions, analyze and cue movement patterns, analyze and modify body mechanics/ergonomics, assess and modify postural abnormalities, address imbalance/dizziness and instruct in home and community integration to attain remaining goals.    Progress toward goals / Updated goals  See pn     PLAN  []  Upgrade activities as tolerated yes Continue plan of care   []  Discharge due to :    []  Other:      Therapist: Wil Mathew PT    Date: 7/13/2022 Time: 9:23 AM     Future Appointments   Date Time Provider Ki Rudd   7/15/2022 10:00 AM Lee Ann Patrick PT [facility]   7/20/2022  2:15 PM Lee Ann Patrick PT [facility]   7/22/2022 10:00 AM Lee Ann Patrick PT [facility]   7/27/2022 12:30 PM Melinda Jeffers, PT [facility]   7/29/2022 10:00 AM Melinda Jeffers, PT [facility]

## 2022-07-13 NOTE — PROGRESS NOTES
.RADHA [facility] [address] THERAPY  [address] [place] [facility] [address],[facility], [facility] - Phone: [phone]  Fax: [phone]  PROGRESS NOTE  Patient Name: Areli Zhang : 1970   Treatment/Medical Diagnosis: Right ankle pain [M25.571]   Referral Source: Boom Hylton MD     Date of Initial Visit: 22 Attended Visits: 16 Missed Visits: 2     SUMMARY OF TREATMENT  Pt was seen for IE and 15 follow up visits with treatment consisting of therapeutic exercises, nmr, manual therapy and instruction on hep/activity modification  CURRENT STATUS  Pt has made excellent progress with PT since last pn. She has had max pain /10 and no adverse reactions to increased wb. Her STJ impingement occurs only with ambulation on uneven surface or >1 mile. WE added in core stability per your request. She also saw Dr. Priya Atkinson at your office who also recommended PT to address weakness. ext and l rotation 50% loss (-) slump/slr, L hip ir rom reduced, ttp along lumbosacral junction, B lower paraspinals. She has poor triple hinge mobility and limits her squats and functional mobility     Goal/Measure of Progress Goal Met? 1.  Able to perform 3x15 eccentric single leg hr in order to improve push off during gait   Status at last Eval: Double leg  Current Status: 2x15  progressing   2. Pt will ambulate >/=30 minutes without progressive pain in order to complete adls such as grocery shopping   Status at last Eval: 10  Current Status: 25 minutes progressing   3. Increase FOTO by 3 points in order to show functional improvement   Status at last Eval: 63/100 Current Status: 60/100 no     New Goals to be achieved in __4__  weeks:  Continue as above  RECOMMENDATIONS  Continue therapy 2x/4 weeks   If you have any questions/comments please contact us directly at [phone]. Thank you for allowing us to assist in the care of your patient.     Therapist Signature: Yanelis Gayle PT Date: 7/13/2022     Time: 7:55 AM   NOTE TO PHYSICIAN:  PLEASE COMPLETE THE ORDERS BELOW AND FAX TO   [facility]: [phone]  If you are unable to process this request in 24 hours please contact our office: [phone]    ___ I have read the above report and request that my patient continue as recommended.   ___ I have read the above report and request that my patient continue therapy with the following changes/special instructions:_________________________________________________________   ___ I have read the above report and request that my patient be discharged from therapy.      Physician Signature:        Date:       Time:                                 Nancy Jenkins MD

## 2022-07-15 ENCOUNTER — APPOINTMENT (OUTPATIENT)
Dept: PHYSICAL THERAPY | Age: 52
End: 2022-07-15
Payer: COMMERCIAL

## 2022-07-20 ENCOUNTER — HOSPITAL ENCOUNTER (OUTPATIENT)
Dept: PHYSICAL THERAPY | Age: 52
Discharge: HOME | End: 2022-07-20
Payer: COMMERCIAL

## 2022-07-20 PROCEDURE — 97140 MANUAL THERAPY 1/> REGIONS: CPT | Performed by: PHYSICAL THERAPIST

## 2022-07-20 PROCEDURE — 97110 THERAPEUTIC EXERCISES: CPT | Performed by: PHYSICAL THERAPIST

## 2022-07-20 NOTE — PROGRESS NOTES
[facility] Patino PHYSICAL THERAPY - DAILY TREATMENT NOTE    Patient Name: Nura Collazo        Date: 2022  : 1970   yes Patient  Verified  Visit #:     Insurance: Payor: [employer] / Plan: [address] [place] [address] / Product Type: PPO /      In time: 215 Out time: 340   Total Treatment Time: 70     Medicare/[facility] Time Tracking (below)   Total Timed Codes (min):  60 1:1 Treatment Time:  60     TREATMENT AREA =  Right ankle pain [M25.571]    SUBJECTIVE  Pain Level (on 0 to 10 scale):  0/ 10   Medication Changes/New allergies or changes in medical history, any new surgeries or procedures?    no  If yes, update Summary List   Subjective Functional Status/Changes:  []  No changes reported     Finally feeling better.  Have not been doing a lot of the exercise     OBJECTIVE  Modalities Rationale:     decrease inflammation, decrease pain and increase tissue extensibility to improve patient's ability to complete adls                min [] Estim, type/location:                                                            []  att     []  unatt     []  w/US     []  w/ice    []  w/heat     min []  Mechanical Traction: type/lbs                    []  pro   []  sup   []  int   []  cont    []  before manual    []  after manual     min []  Ultrasound, settings/location:        min []  Iontophoresis w/ dexamethasone, location:                                                []  take home patch       []  in clinic   10 min [x]  Ice     []  Heat    location/position: Supine with bolster     min []  Vasopneumatic Device, press/temp:     If using vaso (only need to measure limb vaso being performed on)      pre-treatment girth :      post-treatment girth :      measured at (landmark location) :       min []  Other:     [] Skin assessment post-treatment (if applicable):    []  intact    []  redness- no adverse reaction                  []redness - adverse reaction:          45 min Therapeutic Exercise:  [x]  See flow sheet Rationale:      increase ROM, increase strength, improve coordination, improve balance and increase proprioception to improve the patients ability to complete adls     15 min Manual Therapy:  Stm/stretch gastroc/soleus, pf, hallux dorsal/plantar glides   Rationale:      decrease pain, increase ROM, increase tissue extensibility, decrease edema  and decrease trigger points to improve patient's ability to complete adls  The manual therapy interventions were performed at a separate and distinct time from the therapeutic activities interventions. Billed With/As:   [] TE   [] TA   [] Neuro   [] Self Care Patient Education: [x] Review HEP    [] Progressed/Changed HEP based on:   [] positioning   [] body mechanics   [] transfers   [] heat/ice application    [] other:      Other Objective/Functional Measures:  Returned to [employer] following lapse of care due to illness  Still has difficulty with single  leg hr due to fatigue vs pain   Post Treatment Pain Level (on 0 to 10) scale:   0  / 10     ASSESSMENT  Assessment/Changes in Function:   No adverse reactions following session   []  See Progress Note/Recertification   Patient will continue to benefit from skilled PT services to modify and progress therapeutic interventions, address functional mobility deficits, address ROM deficits, address strength deficits, analyze and address soft tissue restrictions, analyze and cue movement patterns, analyze and modify body mechanics/ergonomics, assess and modify postural abnormalities, address imbalance/dizziness and instruct in home and community integration to attain remaining goals.    Progress toward goals / Updated goals  Slow progress with eccentric loading      PLAN  []  Upgrade activities as tolerated yes Continue plan of care   []  Discharge due to :    []  Other:      Therapist: Hany Melvin PT    Date: 7/20/2022 Time: 9:23 AM     Future Appointments   Date Time Provider Ki Rudd   7/20/2022  2:15 PM Aury Steven Guy [facility]   7/22/2022 10:45 AM Zurdo Lind,  [address] [facility]   7/27/2022 12:30 PM Zurdo Lind,  [address] [facility]   7/29/2022 10:00 AM Daniel Jeffers, PT [address]

## 2022-07-22 ENCOUNTER — APPOINTMENT (OUTPATIENT)
Dept: PHYSICAL THERAPY | Age: 52
End: 2022-07-22
Payer: COMMERCIAL

## 2022-07-22 ENCOUNTER — HOSPITAL ENCOUNTER (OUTPATIENT)
Dept: PHYSICAL THERAPY | Age: 52
Discharge: HOME | End: 2022-07-22
Payer: COMMERCIAL

## 2022-07-22 PROCEDURE — 97110 THERAPEUTIC EXERCISES: CPT | Performed by: PHYSICAL THERAPIST

## 2022-07-22 PROCEDURE — 97140 MANUAL THERAPY 1/> REGIONS: CPT | Performed by: PHYSICAL THERAPIST

## 2022-07-22 NOTE — PROGRESS NOTES
Armaan [facility] PHYSICAL THERAPY - DAILY TREATMENT NOTE    Patient Name: Areli Zhang        Date: 2022  : 1970   yes Patient  Verified  Visit #:     Insurance: Payor: [employer] / Plan: [address] [place] [address] / Product Type: PPO /      In time: 1025 Out time: 1125   Total Treatment Time: 60     Medicare/[facility] Time Tracking (below)   Total Timed Codes (min):  60 1:1 Treatment Time:  60     TREATMENT AREA =  Right ankle pain [M25.571]    SUBJECTIVE  Pain Level (on 0 to 10 scale):  0/ 10   Medication Changes/New allergies or changes in medical history, any new surgeries or procedures?    no  If yes, update Summary List   Subjective Functional Status/Changes:  []  No changes reported   Saw the podiatrist - gave me these new inserts and said if they dont work he owuld do an injection on that neuroma      OBJECTIVE      45 min Therapeutic Exercise:  [x]  See flow sheet   Rationale:      increase ROM, increase strength, improve coordination, improve balance and increase proprioception to improve the patients ability to complete adls     15 min Manual Therapy:  Stm/stretch gastroc/soleus, pf, hallux dorsal/plantar glides   Rationale:      decrease pain, increase ROM, increase tissue extensibility, decrease edema  and decrease trigger points to improve patient's ability to complete adls  The manual therapy interventions were performed at a separate and distinct time from the therapeutic activities interventions. Billed With/As:   [] TE   [] TA   [] Neuro   [] Self Care Patient Education: [x] Review HEP    [] Progressed/Changed HEP based on:   [] positioning   [] body mechanics   [] transfers   [] heat/ice application    [] other:      Other Objective/Functional Measures:semi-custom inserts were not heat molded.  In addition pt was provided with met pad placed to far forward - modified to appropriate place with improved wb   Te as per flow sheet    Post Treatment Pain Level (on 0 to 10) scale:   0 / 10     ASSESSMENT  Assessment/Changes in Function:   No adverse reactions following session   []  See Progress Note/Recertification   Patient will continue to benefit from skilled PT services to modify and progress therapeutic interventions, address functional mobility deficits, address ROM deficits, address strength deficits, analyze and address soft tissue restrictions, analyze and cue movement patterns, analyze and modify body mechanics/ergonomics, assess and modify postural abnormalities, address imbalance/dizziness and instruct in home and community integration to attain remaining goals.    Progress toward goals / Updated goals  Slow progress with eccentric loading      PLAN  []  Upgrade activities as tolerated yes Continue plan of care   []  Discharge due to :    []  Other:      Therapist: Miguel Calvo, PT    Date: 7/22/2022 Time: 9:23 AM     Future Appointments   Date Time Provider Ki Rudd   7/27/2022 12:30 PM Kaitlin Kemp,  [address] [facility]   7/29/2022 10:00 AM Renan Jeffers, PT [id number]

## 2022-07-27 ENCOUNTER — HOSPITAL ENCOUNTER (OUTPATIENT)
Dept: PHYSICAL THERAPY | Age: 52
Discharge: HOME | End: 2022-07-27
Payer: COMMERCIAL

## 2022-07-27 PROCEDURE — 97140 MANUAL THERAPY 1/> REGIONS: CPT | Performed by: PHYSICAL THERAPIST

## 2022-07-27 NOTE — PROGRESS NOTES
[facility] PHYSICAL THERAPY - DAILY TREATMENT NOTE    Patient Name: Nura Collazo        Date: 2022  : 1970   yes Patient  Verified  Visit #:     Insurance: Payor: [employer]ees / Plan: [address] [place] [address] / Product Type: PPO /      In time: 1240 Out time: 110   Total Treatment Time: 30     Medicare/[facility] Time Tracking (below)   Total Timed Codes (min):  30 1:1 Treatment Time:  30     TREATMENT AREA =  Right ankle pain [M25.571]    SUBJECTIVE  Pain Level (on 0 to 10 scale):  3/ 10   Medication Changes/New allergies or changes in medical history, any new surgeries or procedures?    no  If yes, update Summary List   Subjective Functional Status/Changes:  []  No changes reported   Saw the podiatrist - gave me these new inserts and said if they dont work he owuld do an injection on that neuroma      OBJECTIVE    30 min Manual Therapy:  Stm/stretch gastroc/soleus, pf, hallux dorsal/plantar glides   Rationale:      decrease pain, increase ROM, increase tissue extensibility, decrease edema  and decrease trigger points to improve patient's ability to complete adls  The manual therapy interventions were performed at a separate and distinct time from the therapeutic activities interventions. Billed With/As:   [] TE   [] TA   [] Neuro   [] Self Care Patient Education: [x] Review HEP    [] Progressed/Changed HEP based on:   [] positioning   [] body mechanics   [] transfers   [] heat/ice application    [] other:      Other Objective/Functional Measures:  Pt arrived to session visually fatigued and difficulty performing ambulation/transfers without rest break - mt only due to this.  Ttp along base of 5ht   Post Treatment Pain Level (on 0 to 10) scale:   0  / 10     ASSESSMENT  Assessment/Changes in Function:   Overall general fatigue requiring modification with program   []  See Progress Note/Recertification   Patient will continue to benefit from skilled PT services to modify and progress therapeutic interventions, address functional mobility deficits, address ROM deficits, address strength deficits, analyze and address soft tissue restrictions, analyze and cue movement patterns, analyze and modify body mechanics/ergonomics, assess and modify postural abnormalities, address imbalance/dizziness and instruct in home and community integration to attain remaining goals.    Progress toward goals / Updated goals  No changes towards goals this session      PLAN  []  Upgrade activities as tolerated yes Continue plan of care   []  Discharge due to :    []  Other:      Therapist: Glendy Andrew PT    Date: 7/27/2022 Time: 9:23 AM     Future Appointments   Date Time Provider Ki Rudd   7/29/2022 10:00 AM Beba Jeffers, PT [id number]

## 2022-07-29 ENCOUNTER — APPOINTMENT (OUTPATIENT)
Dept: PHYSICAL THERAPY | Age: 52
End: 2022-07-29
Payer: COMMERCIAL

## 2022-08-08 ENCOUNTER — HOSPITAL ENCOUNTER (OUTPATIENT)
Dept: PHYSICAL THERAPY | Age: 52
Discharge: HOME | End: 2022-08-08
Payer: COMMERCIAL

## 2022-08-08 PROCEDURE — 97140 MANUAL THERAPY 1/> REGIONS: CPT | Performed by: PHYSICAL THERAPIST

## 2022-08-08 PROCEDURE — 97110 THERAPEUTIC EXERCISES: CPT | Performed by: PHYSICAL THERAPIST

## 2022-08-08 NOTE — PROGRESS NOTES
.RADHA Etienne [address] THERAPY  [address] [place], [place] [address],[place], [address] [facility] - Phone: [phone]  Fax: [phone]  PROGRESS NOTE  Patient Name: Yahaira Bynum : 1970   Treatment/Medical Diagnosis: Right ankle pain [M25.571]   Referral Source: Allie Peterson MD     Date of Initial Visit: 22 Attended Visits: 20 Missed Visits: 3     SUMMARY OF TREATMENT  Pt was seen for IE and 19 follow up visit with treatment consisting of therapeutic exercises for LE strengthening, core stabilization, balance, gait training, manual therapy and instruction on hep/activity modification   CURRENT STATUS  Pt has made excellent progress with PT. Reports 90% improvement in her ankle/pain discomfort. She has full arom all directions and is able to perform eccentric loading without c/o. Her biggest limitation is uneven terrain and split stance activities. Her posterior chain and transverse abdominal are still weak creating instability. She has 2.5 weeks before her international trip and would benefit from attendance to ensure maximum gains from PT    Goal/Measure of Progress Goal Met? 1.  Able to perform 3x15 eccentric hr to improve push off during gait   Status at last Eval: 2x15 Current Status: 3x15 yes   2. Ambulate 30 minutes without progressive pain in order to complete adls   Status at last Eval: 25 minutes  Current Status: 40+ yes   3. Increase foto by 3 points in order to show functional improvement   Status at last Eval: 60/100 Current Status: Pt did not fill out n/a     New Goals to be achieved in __2.5__  weeks:  1. Achieve goal #3  2. Able to ambulate 30 minutes on uneven terrain in order to return to plof  3. I with advanced hep in order to prepare for dc  RECOMMENDATIONS  Continue therapy 2x/2.5 weeks   If you have any questions/comments please contact us directly at [phone].    Thank you for allowing us to assist in the care of your patient. Therapist Signature: Ambrocio Cox PT Date: 8/8/2022     Time: 5:00 PM   NOTE TO PHYSICIAN:  PLEASE COMPLETE THE ORDERS BELOW AND FAX TO   [facility]: [phone]  If you are unable to process this request in 24 hours please contact our office: [phone]    ___ I have read the above report and request that my patient continue as recommended.   ___ I have read the above report and request that my patient continue therapy with the following changes/special instructions:_________________________________________________________   ___ I have read the above report and request that my patient be discharged from therapy.      Physician Signature:        Date:       Time:                                 Nathaniel Wang MD

## 2022-08-08 NOTE — PROGRESS NOTES
Fatmata Patino PHYSICAL THERAPY - DAILY TREATMENT NOTE    Patient Name: Nura Collazo        Date: 2022  : 1970   yes Patient  Verified  Visit #:     Insurance: Payor: Florence Fees / Plan: [address] [place] [address] / Product Type: PPO /      In time: 340 Out time: 420   Total Treatment Time: 40     Medicare/[facility] Time Tracking (below)   Total Timed Codes (min):  40 1:1 Treatment Time:  40     TREATMENT AREA =  Right ankle pain [M25.571]    SUBJECTIVE  Pain Level (on 0 to 10 scale):  0/ 10   Medication Changes/New allergies or changes in medical history, any new surgeries or procedures?    no  If yes, update Summary List   Subjective Functional Status/Changes:  []  No changes reported   See pn     OBJECTIVE      15 min Therapeutic Exercise:  [x]  See flow sheet   Rationale:      increase ROM, increase strength, improve coordination, improve balance and increase proprioception to improve the patients ability to complete adls     25 min Manual Therapy:  Stm/stretch gastroc/soleus, pf, hallux dorsal/plantar glides, lumbar grade iv rotation mobs   Rationale:      decrease pain, increase ROM, increase tissue extensibility, decrease edema  and decrease trigger points to improve patient's ability to complete adls  The manual therapy interventions were performed at a separate and distinct time from the therapeutic activities interventions.       Billed With/As:   [] TE   [] TA   [] Neuro   [] Self Care Patient Education: [x] Review HEP    [] Progressed/Changed HEP based on:   [] positioning   [] body mechanics   [] transfers   [] heat/ice application    [] other:      Other Objective/Functional Measures:see pn   Post Treatment Pain Level (on 0 to 10) scale:   0  / 10     ASSESSMENT  Assessment/Changes in Function:   See pn   []  See Progress Note/Recertification   Patient will continue to benefit from skilled PT services to modify and progress therapeutic interventions, address functional mobility deficits, address ROM deficits, address strength deficits, analyze and address soft tissue restrictions, analyze and cue movement patterns, analyze and modify body mechanics/ergonomics, assess and modify postural abnormalities, address imbalance/dizziness and instruct in home and community integration to attain remaining goals.    Progress toward goals / Updated goals  See pn     PLAN  []  Upgrade activities as tolerated yes Continue plan of care   []  Discharge due to :    []  Other:      Therapist: Gretchen Reyes, PT    Date: 8/8/2022 Time: 9:23 AM     Future Appointments   Date Time Provider Ki Rudd   8/8/2022  3:30 PM Riya Starkey, PT [facility]

## 2022-08-10 ENCOUNTER — APPOINTMENT (OUTPATIENT)
Dept: PHYSICAL THERAPY | Age: 52
End: 2022-08-10
Payer: COMMERCIAL

## 2022-08-11 ENCOUNTER — APPOINTMENT (OUTPATIENT)
Dept: PHYSICAL THERAPY | Age: 52
End: 2022-08-11
Payer: COMMERCIAL

## 2022-08-12 ENCOUNTER — APPOINTMENT (OUTPATIENT)
Dept: PHYSICAL THERAPY | Age: 52
End: 2022-08-12
Payer: COMMERCIAL

## 2022-08-17 ENCOUNTER — HOSPITAL ENCOUNTER (OUTPATIENT)
Dept: PHYSICAL THERAPY | Age: 52
Discharge: HOME | End: 2022-08-17
Payer: COMMERCIAL

## 2022-08-17 PROCEDURE — 97140 MANUAL THERAPY 1/> REGIONS: CPT | Performed by: PHYSICAL THERAPIST

## 2022-08-17 NOTE — PROGRESS NOTES
Willi Dolan PHYSICAL THERAPY - DAILY TREATMENT NOTE    Patient Name: Rodell Galeazzi        Date: 2022  : 1970   yes Patient  Verified  Visit #:     Insurance: Payor: Nisreen Luke / Plan: [address] [place] [address] / Product Type: PPO /      In time: 930 Out time: 1010   Total Treatment Time: 40     Medicare/[facility] Time Tracking (below)   Total Timed Codes (min):  40 1:1 Treatment Time:  40     TREATMENT AREA =  Right ankle pain [M25.571]    SUBJECTIVE  Pain Level (on 0 to 10 scale):  0/ 10   Medication Changes/New allergies or changes in medical history, any new surgeries or procedures?    no  If yes, update Summary List   Subjective Functional Status/Changes:  []  No changes reported   I saw the foot doctor he said that I need custom inserts so he is sending me to a company on Friday. He was not convinced that I have a neuroma like the dpm said     OBJECTIVE        40 min Manual Therapy:  Stm/stretch gastroc/soleus, pf, hallux dorsal/plantar glides, lumbar grade iv rotation mobs, sacral flexion, tl junction release    Rationale:      decrease pain, increase ROM, increase tissue extensibility, decrease edema  and decrease trigger points to improve patient's ability to complete adls  The manual therapy interventions were performed at a separate and distinct time from the therapeutic activities interventions.       Billed With/As:   [] TE   [] TA   [] Neuro   [] Self Care Patient Education: [x] Review HEP    [] Progressed/Changed HEP based on:   [] positioning   [] body mechanics   [] transfers   [] heat/ice application    [] other:      Other Objective/Functional Measures:  Loss of poserior ilial rotation on R during gait - improved after mt   Post Treatment Pain Level (on 0 to 10) scale:   0  / 10     ASSESSMENT  Assessment/Changes in Function:   No pain after session    []  See Progress Note/Recertification   Patient will continue to benefit from skilled PT services to modify and progress therapeutic interventions, address functional mobility deficits, address ROM deficits, address strength deficits, analyze and address soft tissue restrictions, analyze and cue movement patterns, analyze and modify body mechanics/ergonomics, assess and modify postural abnormalities, address imbalance/dizziness and instruct in home and community integration to attain remaining goals.    Progress toward goals / Updated goals  Will continue 2 remaining weeks and pt to go out of [facility]      [employer]  []  Upgrade activities as tolerated yes Continue plan of care   []  Discharge due to :    []  Other:      Therapist: Emma Castillo PT    Date: 8/17/2022 Time: 9:23 AM     Future Appointments   Date Time Provider Ki Rudd   8/17/2022  8:45 AM Radha Zavala, [facility]   8/19/2022  7:00 AM Radha Zavala, PT [facility]   8/22/2022  3:30 PM Radha Zavala PT [facility]   8/24/2022  3:00 PM Radha Zavala, [facility]

## 2022-08-19 ENCOUNTER — HOSPITAL ENCOUNTER (OUTPATIENT)
Dept: PHYSICAL THERAPY | Age: 52
Discharge: HOME | End: 2022-08-19
Payer: COMMERCIAL

## 2022-08-19 PROCEDURE — 97140 MANUAL THERAPY 1/> REGIONS: CPT | Performed by: PHYSICAL THERAPIST

## 2022-08-22 ENCOUNTER — HOSPITAL ENCOUNTER (OUTPATIENT)
Dept: PHYSICAL THERAPY | Age: 52
Discharge: HOME | End: 2022-08-22
Payer: COMMERCIAL

## 2022-08-22 PROCEDURE — 97140 MANUAL THERAPY 1/> REGIONS: CPT | Performed by: PHYSICAL THERAPIST

## 2022-08-22 NOTE — PROGRESS NOTES
Iveth Mahan PHYSICAL THERAPY - DAILY TREATMENT NOTE    Patient Name: Yahaira Bynum        Date: 2022  : 1970   yes Patient  Verified  Visit #:     Insurance: Payor: Chris Heller / Plan: [address] [place] [address] / Product Type: PPO /      In time: 1100 Out time: 1140   Total Treatment Time: 40     Medicare/[facility] Time Tracking (below)   Total Timed Codes (min):  40 1:1 Treatment Time:  40     TREATMENT AREA =  Right ankle pain [M25.571]    SUBJECTIVE  Pain Level (on 0 to 10 scale):  0/ 10   Medication Changes/New allergies or changes in medical history, any new surgeries or procedures?    no  If yes, update Summary List   Subjective Functional Status/Changes:  []  No changes reported   Sorry im late been running around     OBJECTIVE        40 min Manual Therapy:  Stm/stretch gastroc/soleus, pf, hallux dorsal/plantar glides, lumbar grade iv rotation mobs, sacral flexion, tl junction release    Rationale:      decrease pain, increase ROM, increase tissue extensibility, decrease edema  and decrease trigger points to improve patient's ability to complete adls  The manual therapy interventions were performed at a separate and distinct time from the therapeutic activities interventions.       Billed With/As:   [] TE   [] TA   [] Neuro   [] Self Care Patient Education: [x] Review HEP    [] Progressed/Changed HEP based on:   [] positioning   [] body mechanics   [] transfers   [] heat/ice application    [] other:      Other Objective/Functional Measures:  Loss of poserior ilial rotation on R during gait  Ttp ashu g1st mtp   Post Treatment Pain Level (on 0 to 10) scale:   0  / 10     ASSESSMENT  Assessment/Changes in Function:   No pain after session    []  See Progress Note/Recertification   Patient will continue to benefit from skilled PT services to modify and progress therapeutic interventions, address functional mobility deficits, address ROM deficits, address strength deficits, analyze and address soft tissue restrictions, analyze and cue movement patterns, analyze and modify body mechanics/ergonomics, assess and modify postural abnormalities, address imbalance/dizziness and instruct in home and community integration to attain remaining goals.    Progress toward goals / Updated goals  Will continue 2 remaining weeks and pt to go out of [facility]      [employer]  []  Upgrade activities as tolerated yes Continue plan of care   []  Discharge due to :    []  Other:      Therapist: Davon Gaffney PT    Date: 8/22/2022 Time: 9:23 AM     Future Appointments   Date Time Provider Ki Rudd   8/22/2022  3:30 PM Pete Allen, PT [facility]   8/24/2022  3:00 PM Pete Allen, PT [facility] [place]

## 2022-08-22 NOTE — PROGRESS NOTES
Latrell Duffy PHYSICAL THERAPY - DAILY TREATMENT NOTE    Patient Name: Kelly Velasquez        Date: 2022  : 1970   yes Patient  Verified  Visit #:     Insurance: Payor: Yolie Rm / Plan: [address] [place] [address] / Product Type: PPO /      In time: 315 Out time: 350   Total Treatment Time: 35     Medicare/[facility] Time Tracking (below)   Total Timed Codes (min):  35 1:1 Treatment Time:  35     TREATMENT AREA =  Right ankle pain [M25.571]    SUBJECTIVE  Pain Level (on 0 to 10 scale):  0/ 10   Medication Changes/New allergies or changes in medical history, any new surgeries or procedures?    no  If yes, update Summary List   Subjective Functional Status/Changes:  []  No changes reported   My back is so jacked I had a mri today to address      OBJECTIVE        35 min Manual Therapy:  Stm/stretch gastroc/soleus, pf, hallux dorsal/plantar glides, lumbar grade iv rotation mobs, sacral flexion, tl junction release    Rationale:      decrease pain, increase ROM, increase tissue extensibility, decrease edema  and decrease trigger points to improve patient's ability to complete adls  The manual therapy interventions were performed at a separate and distinct time from the therapeutic activities interventions.       Billed With/As:   [] TE   [] TA   [] Neuro   [] Self Care Patient Education: [x] Review HEP    [] Progressed/Changed HEP based on:   [] positioning   [] body mechanics   [] transfers   [] heat/ice application    [] other:      Other Objective/Functional Measures:  Pt arrived to session with minimal trunk mobiliyt and flexed gait pattern, difficulty with all transfers   Attempted TE but unable to tolerate any due to back pain   Post Treatment Pain Level (on 0 to 10) scale:   0  / 10     ASSESSMENT  Assessment/Changes in Function:   Entire session limited by back pain    []  See Progress Note/Recertification   Patient will continue to benefit from skilled PT services to modify and progress therapeutic interventions, address functional mobility deficits, address ROM deficits, address strength deficits, analyze and address soft tissue restrictions, analyze and cue movement patterns, analyze and modify body mechanics/ergonomics, assess and modify postural abnormalities, address imbalance/dizziness and instruct in home and community integration to attain remaining goals.    Progress toward goals / Updated goals  D/c next visit due two visits  maximum gains with PT      PLAN  []  Upgrade activities as tolerated yes Continue plan of care   []  Discharge due to :    []  Other:      Therapist: Ana M Lugo PT    Date: 8/22/2022 Time: 9:23 AM     Future Appointments   Date Time Provider Ki Rudd   8/22/2022  3:30 PM Mihcela Hughes,  [address] [facility]   8/24/2022  3:00 PM Michela Hughes,  [address] [facility]

## 2022-08-23 ENCOUNTER — HOSPITAL ENCOUNTER (OUTPATIENT)
Dept: PHYSICAL THERAPY | Age: 52
Discharge: HOME | End: 2022-08-23
Payer: COMMERCIAL

## 2022-08-23 PROCEDURE — 97140 MANUAL THERAPY 1/> REGIONS: CPT | Performed by: PHYSICAL THERAPIST

## 2022-08-23 NOTE — PROGRESS NOTES
Iveth Mahan PHYSICAL THERAPY - DAILY TREATMENT NOTE    Patient Name: Yahaira Bynum        Date: 2022  : 1970   yes Patient  Verified  Visit #:     Insurance: Payor: BLUE CROSS / Plan: [address] [place] [address] / Product Type: PPO /      In time: 210 Out time: 300   Total Treatment Time: 50     Medicare/[employer] Time Tracking (below)   Total Timed Codes (min):  50 1:1 Treatment Time:  50     TREATMENT AREA =  Right ankle pain [M25.571]    SUBJECTIVE  Pain Level (on 0 to 10 scale):  0/ 10   Medication Changes/New allergies or changes in medical history, any new surgeries or procedures?    no  If yes, update Summary List   Subjective Functional Status/Changes:  []  No changes reported   Got my mri results      OBJECTIVE        50 min Manual Therapy:  Stm/stretch gtl junction lumbar paraspinals, met for upper lumbar rotation   Rationale:      decrease pain, increase ROM, increase tissue extensibility, decrease edema  and decrease trigger points to improve patient's ability to complete adls  The manual therapy interventions were performed at a separate and distinct time from the therapeutic activities interventions.       Billed With/As:   [] TE   [] TA   [] Neuro   [] Self Care Patient Education: [x] Review HEP    [] Progressed/Changed HEP based on:   [] positioning   [] body mechanics   [] transfers   [] heat/ice application    [] other:      Other Objective/Functional Measures:  Reviewed mri findings with pt and advised to discuss medication with upcoming pain management  Able to perform bed transfers with improved form and reduce pain   Post Treatment Pain Level (on 0 to 10) scale:   0  / 10     ASSESSMENT  Assessment/Changes in Function:   Entire session limited by back pain    []  See Progress Note/Recertification   Patient will continue to benefit from skilled PT services to modify and progress therapeutic interventions, address functional mobility deficits, address ROM deficits, address strength deficits, analyze and address soft tissue restrictions, analyze and cue movement patterns, analyze and modify body mechanics/ergonomics, assess and modify postural abnormalities, address imbalance/dizziness and instruct in home and community integration to attain remaining goals.    Progress toward goals / Updated goals  D/c next visit due two visits  maximum gains with PT      PLAN  []  Upgrade activities as tolerated yes Continue plan of care   []  Discharge due to :    []  Other:      Therapist: Rogelio Beckham PT    Date: 8/23/2022 Time: 9:23 AM     Future Appointments   Date Time Provider Ki Rudd   8/24/2022  3:00 PM Siria Crystal, [place] [facility]   8/25/2022  7:00 AM Ayad Jeffers, PT [facility]

## 2022-08-25 ENCOUNTER — HOSPITAL ENCOUNTER (OUTPATIENT)
Dept: PHYSICAL THERAPY | Age: 52
Discharge: HOME | End: 2022-08-25
Payer: COMMERCIAL

## 2022-08-25 PROCEDURE — 97140 MANUAL THERAPY 1/> REGIONS: CPT | Performed by: PHYSICAL THERAPIST

## 2022-08-25 NOTE — PROGRESS NOTES
Lg Juan PHYSICAL THERAPY - DAILY TREATMENT NOTE    Patient Name: Fabián Quinones        Date: 2022  : 1970   yes Patient  Verified  Visit #:     Insurance: Payor: BLUE CROSS / Plan: [address] [place] [address] / Product Type: PPO /      In time: 5363 Out time: 1105   Total Treatment Time: 50     Medicare/[facility] Time Tracking (below)   Total Timed Codes (min):  50 1:1 Treatment Time:  50     TREATMENT AREA =  Right ankle pain [M25.571]    SUBJECTIVE  Pain Level (on 0 to 10 scale):  0/ 10   Medication Changes/New allergies or changes in medical history, any new surgeries or procedures?    no  If yes, update Summary List   Subjective Functional Status/Changes:  []  No changes reported   See pn     OBJECTIVE        50 min Manual Therapy:  Stm/stretch gtl junction lumbar paraspinals, met for upper lumbar rotation   Rationale:      decrease pain, increase ROM, increase tissue extensibility, decrease edema  and decrease trigger points to improve patient's ability to complete adls  The manual therapy interventions were performed at a separate and distinct time from the therapeutic activities interventions.       Billed With/As:   [] TE   [] TA   [] Neuro   [] Self Care Patient Education: [x] Review HEP    [] Progressed/Changed HEP based on:   [] positioning   [] body mechanics   [] transfers   [] heat/ice application    [] other:      Other Objective/Functional Measures:  See pn   Post Treatment Pain Level (on 0 to 10) scale:   0  / 10     ASSESSMENT  Assessment/Changes in Function:   Esee pn   []  See Progress Note/Recertification   Patient will continue to benefit from skilled PT services to modify and progress therapeutic interventions, address functional mobility deficits, address ROM deficits, address strength deficits, analyze and address soft tissue restrictions, analyze and cue movement patterns, analyze and modify body mechanics/ergonomics, assess and modify postural abnormalities, address imbalance/dizziness and instruct in home and community integration to attain remaining goals. Progress toward goals / Updated goals  See pn     PLAN  []  Upgrade activities as tolerated yes Continue plan of care   []  Discharge due to :    []  Other:      Therapist: Karishma Singh PT    Date: 8/25/2022 Time: 9:23 AM     No future appointments.

## 2022-09-13 ENCOUNTER — HOSPITAL ENCOUNTER (OUTPATIENT)
Dept: PHYSICAL THERAPY | Age: 52
Discharge: HOME | End: 2022-09-13
Payer: COMMERCIAL

## 2022-09-13 PROCEDURE — 97535 SELF CARE MNGMENT TRAINING: CPT | Performed by: PHYSICAL THERAPIST

## 2022-09-13 PROCEDURE — 97140 MANUAL THERAPY 1/> REGIONS: CPT | Performed by: PHYSICAL THERAPIST

## 2022-09-13 NOTE — RT PROTOCOL NOTE
Jose Daniel Sargent PHYSICAL THERAPY - DAILY TREATMENT NOTE    Patient Name: Bryan Bourne        Date: 2022  : 1970   yes Patient  Verified  Visit #:   32   of   26  Insurance: Payor: BLUE CROSS / Plan: [address] [place] [address] / Product Type: PPO /      In time: 320 Out time: 415   Total Treatment Time: 55     Medicare/[facility] Time Tracking (below)   Total Timed Codes (min):  55 1:1 Treatment Time:  55     TREATMENT AREA =  Right ankle pain [M25.571]    SUBJECTIVE  Pain Level (on 0 to 10 scale):  0  / 10   Medication Changes/New allergies or changes in medical history, any new surgeries or procedures?    no  If yes, update Summary List   Subjective Functional Status/Changes:  []  No changes reported     See dc           OBJECTIVE    30 min Manual Therapy: Mfr ls paraspinals, hip mobs, ls mobs, gastroc soleus stretch and relesae    Rationale:      decrease pain, increase ROM, and increase tissue extensibility to improve patient's ability to complete adls  The manual therapy interventions were performed at a separate and distinct time from the therapeutic activities interventions. 25 min Self Care: Advanced hep, poc, activity modification, footwear suggestions   Rationale:    increase strength, improve coordination, improve balance, and increase proprioception to improve the patients ability to complete adls    Billed With/As:   [] TE   [] TA   [] Neuro   [] Self Care Patient Education: [x] Review HEP    [] Progressed/Changed HEP based on:   [] positioning   [] body mechanics   [] transfers   [] heat/ice application    [] other:      Other Objective/Functional Measures:    Pt arrived with new referral for neck and shoulders, advised new ie needed  See dc     Post Treatment Pain Level (on 0 to 10) scale:   0  / 10     ASSESSMENT  Assessment/Changes in Function:     See dc     []  See Progress Note/Recertification   Patient will continue to benefit from skilled PT services to  see dc  to attain remaining goals. Progress toward goals / Updated goals:    See dc     PLAN  []  Upgrade activities as tolerated no Continue plan of care   []  Discharge due to :    []  Other:      Therapist: Samuel Catalan PT    Date: 9/13/2022 Time: 4:18 PM     No future appointments.

## 2022-09-14 ENCOUNTER — HOSPITAL ENCOUNTER (OUTPATIENT)
Dept: PHYSICAL THERAPY | Age: 52
Discharge: HOME | End: 2022-09-14
Payer: COMMERCIAL

## 2022-09-14 PROCEDURE — 97140 MANUAL THERAPY 1/> REGIONS: CPT | Performed by: PHYSICAL THERAPIST

## 2022-09-14 PROCEDURE — 97161 PT EVAL LOW COMPLEX 20 MIN: CPT | Performed by: PHYSICAL THERAPIST

## 2022-09-14 NOTE — RT PROTOCOL NOTE
RICARD[facility] THERAPY   [facility], [facility], [facility] - Phone: [phone]  Fax: [phone] / [facility]  Patient Name: Brian Scott : 1970   Medical   Diagnosis: Shoulder impingement Treatment Diagnosis: Pain in right shoulder [M25.511]   Onset Date: X2 months     Referral Source: Jose L Caballero MD Start of Care [facility]): 2022   Prior Hospitalization: See medical history Provider #: [id number]   Prior Level of Function: Able to lift/reach asymptomatic   Comorbidities: Arthritis, depression asthma, lbp   Medications: Verified on Patient Summary List   The Plan of Care and following information is based on the information from the initial evaluation.   ===========================================================================================  Assessment / key information:  pleasant 46 RHD F dx with shoulder impingement that spontaneously began 2 months ago. I treated her for her low back and ankle so am well aware of her pmh and plof. She reports B pain R>L increasing with sleeping and reaching activities  (4/10). She has no formal imaging and is pending consult with Dr. Will Ybarra at [facility] on 10/13/22. Objective findings: R shoulder 148 before hike, FIR l3 spinal level, L shoulder wnl but pain at end range flexion, B +cross over test, speeds, R empty can and painful arc, reduced ir/er on R. Ttp B biceps tendon as well as r posterior cuff. S/s consistent with B shoulder impingement.  Will attempt PT in order to address problem list below.   ===========================================================================================  Eval Complexity: History HIGH Complexity :3+ comorbidities / personal factors will impact the outcome/ POC ;  Examination  MEDIUM Complexity : 3 Standardized tests and measures addressing body structure, function, activity limitation and / or participation in recreation ; Presentation LOW Complexity : Stable, uncomplicated ;  Decision Making MEDIUM Complexity : FOTO score of 26-74; Overall Complexity LOW   Problem List: pain affecting function, decrease ROM, decrease strength, decrease ADL/ functional abilitiies, decrease activity tolerance, and decrease flexibility/ joint mobility   Treatment Plan may include any combination of the following: Therapeutic exercise, Therapeutic activities, Neuromuscular re-education, Physical agent/modality, Manual therapy, Patient education, Self Care training, and Functional mobility training  Patient / Family readiness to learn indicated by: asking questions, trying to perform skills, and interest  Persons(s) to be included in education: patient (P)  Barriers to Learning/Limitations: None  Measures taken, if barriers to learning:    Patient Goal (s): Reduce pain   Patient self reported health status: good  Rehabilitation Potential: good  Short Term Goals: To be accomplished in  2  weeks:  Compliant with hep  Able to perform 2x10 sl er in order to improve rtc strength for lifting  Long Term Goals: To be accomplished in  4  weeks:  Note daily max pain </=1/10 in order to increase participation in adls  2. Increase FOTO by 11 points in order to show functional improvement   3. Increase R FIR t12 to A with dressing   Frequency / Duration:   Patient to be seen  2  times per week for 4  weeks:  Patient / Caregiver education and instruction: self care and activity modification  Therapist Signature: Rogelio Beckham PT Date: 3/21/1412   Certification Period: na Time: 2:02 PM   ===========================================================================================  I certify that the above Physical Therapy Services are being furnished while the patient is under my care. I agree with the treatment plan and certify that this therapy is necessary.     Physician Signature:        Date:       Time: Augustin Santo MD  Please sign and return to [facility] at [facility], [facility]. or you may fax the signed copy to [phone]. Thank you.

## 2022-09-14 NOTE — PROGRESS NOTES
[facility] PH[facility]ICAL [facility]ER[facility] - DAILY TREATMENT NOTE    Patient Name: Isabel Jay        Date: 2022  : 1970   yes Patient  Verified  Visit #:     Insurance: Payor: Joanie Saavedra / Plan: [address] [place] [address] / Product Type: PPO /      In time: 1020 Out time: 1100   Total Treatment Time: 40     Medicare/BCBS Time Tracking (below)   Total Timed Codes (min):  10 1:1 Treatment Time:  10     TREATMENT AREA =  Pain in right shoulder [M25.511]    SUBJECTIVE  Pain Level (on 0 to 10 scale):  0  / 10   Medication Changes/New allergies or changes in medical history, any new surgeries or procedures?    no  If yes, update Summary List   Subjective Functional Status/Changes:  []  No changes reported     See ie          OBJECTIVE      10 min Manual Therapy: R ghj prom all directions, cfm/stretch lhb   Rationale:      decrease pain, increase ROM, increase tissue extensibility, and decrease trigger points to improve patient's ability to complete adls  The manual therapy interventions were performed at a separate and distinct time from the therapeutic activities interventions.       Billed With/As:   [] TE   [] TA   [] Neuro   [] Self Care Patient Education: [x] Review HEP    [] Progressed/Changed HEP based on:   [] positioning   [] body mechanics   [] transfers   [] heat/ice application    [] other:      Other Objective/Functional Measures:    No increase in pain following mt  See ie     Post Treatment Pain Level (on 0 to 10) scale:   0  / 10     ASSESSMENT  Assessment/Changes in Function:     See ie     []  See Progress Note/Recertification   Patient will continue to benefit from skilled PT services to modify and progress therapeutic interventions, address functional mobility deficits, address ROM deficits, address strength deficits, analyze and address soft tissue restrictions, analyze and cue movement patterns, analyze and modify body mechanics/ergonomics, assess and modify postural abnormalities, and instruct in home and community integration to attain remaining goals.    Progress toward goals / Updated goals:    See ie     PLAN  []  Upgrade activities as tolerated yes Continue plan of care   []  Discharge due to :    []  Other:      Therapist: Perri Christian PT    Date: 9/14/2022 Time: 1:58 PM     Future Appointments   Date Time Provider Ki Rudd   9/16/2022 10:00 AM Ashley Hall PT [facility]   9/20/2022  3:15 PM [facility]   9/23/2022 10:45 AM Ashley Hall PT [facility]   9/26/2022  2:00 PM [facility]   9/28/2022  2:15 PM [facility]   10/3/2022 12:15 PM Ashley Hall PT [facility]   10/5/2022 12:15 PM Ashley Hall, PT [address] [id number]

## 2022-09-20 ENCOUNTER — HOSPITAL ENCOUNTER (OUTPATIENT)
Dept: PHYSICAL THERAPY | Age: 52
End: 2022-09-20
Payer: COMMERCIAL

## 2022-09-21 ENCOUNTER — HOSPITAL ENCOUNTER (OUTPATIENT)
Dept: PHYSICAL THERAPY | Age: 52
Discharge: HOME | End: 2022-09-21
Payer: COMMERCIAL

## 2022-09-21 PROCEDURE — 97140 MANUAL THERAPY 1/> REGIONS: CPT | Performed by: PHYSICAL THERAPIST

## 2022-09-21 PROCEDURE — 97110 THERAPEUTIC EXERCISES: CPT | Performed by: PHYSICAL THERAPIST

## 2022-09-21 NOTE — PROGRESS NOTES
3. PHYSICAL THERAPY - DAILY TREATMENT NOTE    Patient Name: Mireya Moore        Date: 2022  : 1970   yes Patient  Verified  Visit #:   2   of   9  Insurance: Payor: Philip Rizzo / Plan: [address] [place] [address] / Product Type: PPO /      In time: 235 Out time: 330   Total Treatment Time: 55     Medicare/[facility] Time Tracking (below)   Total Timed Codes (min):  55 1:1 Treatment Time:  55     TREATMENT AREA =  Pain in right shoulder [M25.511]    SUBJECTIVE  Pain Level (on 0 to 10 scale):  2  / 10   Medication Changes/New allergies or changes in medical history, any new surgeries or procedures?    no  If yes, update Summary List   Subjective Functional Status/Changes:  []  No changes reported     I feel just yucky from just traveling and getting over whatever I have. Have not really done much but I feel swollen          OBJECTIVE           15 min Therapeutic Exercise:  [x]  See flow sheet   Rationale:      increase ROM and increase strength to improve the patients ability to complete adls     40 min Manual Therapy: C/s mobs, b first rib depression, posterior cuff release, ghj prom all directions B   Rationale:      decrease pain, increase ROM, and increase tissue extensibility to improve patient's ability to reach  The manual therapy interventions were performed at a separate and distinct time from the therapeutic activities interventions.       Billed With/As:   [] TE   [] TA   [] Neuro   [] Self Care Patient Education: [x] Review HEP    [] Progressed/Changed HEP based on:   [] positioning   [] body mechanics   [] transfers   [] heat/ice application    [] other:      Other Objective/Functional Measures:    Ttp along R posterior cuff and er at 90 limited to 65 compared to 85 on L     Post Treatment Pain Level (on 0 to 10) scale:   0  / 10     ASSESSMENT  Assessment/Changes in Function:     Left pain free      []  See Progress Note/Recertification   Patient will continue to benefit from skilled PT services to modify and progress therapeutic interventions, address functional mobility deficits, address ROM deficits, address strength deficits, analyze and address soft tissue restrictions, analyze and cue movement patterns, analyze and modify body mechanics/ergonomics, assess and modify postural abnormalities, and instruct in home and community integration to attain remaining goals. Progress toward goals / Updated goals:     Will admin hep next session to address stg #1     PLAN  []  Upgrade activities as tolerated yes Continue plan of care   []  Discharge due to :    []  Other:      Therapist: Samuel Catalan PT    Date: 9/21/2022 Time: 12:01 PM     Future Appointments   Date Time Provider Ki Rudd   9/21/2022  2:15 PM Courtney Gamble, PT [facility]   9/23/2022 10:45 AM Courtney Gamble, PT [facility]   9/26/2022  2:00 PM [facility]   9/28/2022  2:15 PM [facility]   10/3/2022 12:15 PM Courtney Gamble, PT [facility]   10/5/2022 12:15 PM Courtney Gamble, PT [phone]

## 2022-10-03 ENCOUNTER — HOSPITAL ENCOUNTER (OUTPATIENT)
Dept: PHYSICAL THERAPY | Age: 52
Discharge: HOME | End: 2022-10-03
Payer: COMMERCIAL

## 2022-10-03 PROCEDURE — 97140 MANUAL THERAPY 1/> REGIONS: CPT | Performed by: PHYSICAL THERAPIST

## 2022-10-03 PROCEDURE — 97110 THERAPEUTIC EXERCISES: CPT | Performed by: PHYSICAL THERAPIST

## 2022-10-03 NOTE — PROGRESS NOTES
3. PHYSICAL THERAPY - DAILY TREATMENT NOTE    Patient Name: Sly South        Date: 10/3/2022  : 1970   yes Patient  Verified  Visit #:   3   of   9  Insurance: Payor: Alton Capellan / Plan: [address] [place] [address] / Product Type: PPO /      In time:  Out time: 3443   Total Treatment Time: 40     Medicare/[facility] Time Tracking (below)   Total Timed Codes (min):  40 1:1 Treatment Time:  40     TREATMENT AREA =  Pain in right shoulder [M25.511]    SUBJECTIVE  Pain Level (on 0 to 10 scale):  0  / 10   Medication Changes/New allergies or changes in medical history, any new surgeries or procedures?    no  If yes, update Summary List   Subjective Functional Status/Changes:  []  No changes reported     Shoulders have been feeling good. I have not done any activity [facility]          OBJECTIVE           15 min Therapeutic Exercise:  [x]  See flow sheet   Rationale:      increase ROM and increase strength to improve the patients ability to complete adls     25 min Manual Therapy: C/s mobs, b first rib depression, posterior cuff release, ghj prom all directions B   Rationale:      decrease pain, increase ROM, and increase tissue extensibility to improve patient's ability to reach  The manual therapy interventions were performed at a separate and distinct time from the therapeutic activities interventions.       Billed With/As:   [] TE   [] TA   [] Neuro   [] Self Care Patient Education: [x] Review HEP    [] Progressed/Changed HEP based on:   [] positioning   [] body mechanics   [] transfers   [] heat/ice application    [] other:      Other Objective/Functional Measures:  Able to perform >140 elevation without c/o  +tenderness with crossbody adduction on R     Post Treatment Pain Level (on 0 to 10) scale:   0  / 10     ASSESSMENT  Assessment/Changes in Function:     Left pain free      []  See Progress Note/Recertification   Patient will continue to benefit from skilled PT services to modify and progress therapeutic interventions, address functional mobility deficits, address ROM deficits, address strength deficits, analyze and address soft tissue restrictions, analyze and cue movement patterns, analyze and modify body mechanics/ergonomics, assess and modify postural abnormalities, and instruct in home and community integration to attain remaining goals.    Progress toward goals / Updated goals:    Progress with pain goals      PLAN  []  Upgrade activities as tolerated yes Continue plan of care   []  Discharge due to :    []  Other:      Therapist: Florecita Esqueda PT    Date: 10/3/2022 Time: 12:01 PM     Future Appointments   Date Time Provider Ki Rudd   10/5/2022 12:15 PM Lizabeth Jeffers, PT [id number]

## 2022-10-05 ENCOUNTER — HOSPITAL ENCOUNTER (OUTPATIENT)
Dept: PHYSICAL THERAPY | Age: 52
Discharge: HOME | End: 2022-10-05
Payer: COMMERCIAL

## 2022-10-05 PROCEDURE — 97110 THERAPEUTIC EXERCISES: CPT | Performed by: PHYSICAL THERAPIST

## 2022-10-05 PROCEDURE — 97140 MANUAL THERAPY 1/> REGIONS: CPT | Performed by: PHYSICAL THERAPIST

## 2022-10-05 NOTE — PROGRESS NOTES
3. PHYSICAL THERAPY - DAILY TREATMENT NOTE    Patient Name: Drake Baptiste        Date: 10/5/2022  : 1970   yes Patient  Verified  Visit #:   4  of   9  Insurance: Payor: BLUE CROSS / Plan: [address] [place] [address] / Product Type: PPO /      In time: 7882 Out time: 110   Total Treatment Time: 63     Medicare/BCBS Time Tracking (below)   Total Timed Codes (min):  63 1:1 Treatment Time:  63     TREATMENT AREA =  Pain in right shoulder [M25.511]    SUBJECTIVE  Pain Level (on 0 to 10 scale):  0  / 10   Medication Changes/New allergies or changes in medical history, any new surgeries or procedures?    no  If yes, update Summary List   Subjective Functional Status/Changes:  []  No changes reported       I was doing great until I tweaked my back a little bit, I went to [facility] and they said that there is a discrepancy in my mri vs xray - I go back next week        OBJECTIVE           38 min Therapeutic Exercise:  [x]  See flow sheet   Rationale:      increase ROM and increase strength to improve the patients ability to complete adls     25 min Manual Therapy: C/s mobs, b first rib depression, posterior cuff release, ghj prom all directions B   Rationale:      decrease pain, increase ROM, and increase tissue extensibility to improve patient's ability to reach  The manual therapy interventions were performed at a separate and distinct time from the therapeutic activities interventions.       Billed With/As:   [] TE   [] TA   [] Neuro   [] Self Care Patient Education: [x] Review HEP    [] Progressed/Changed HEP based on:   [] positioning   [] body mechanics   [] transfers   [] heat/ice application    [] other:      Other Objective/Functional Measures:    Increased time to complete te due to modification for back pain  Ttp along posterior lateral arm with end range ir - alleviated with rest    Post Treatment Pain Level (on 0 to 10) scale:   0  / 10     ASSESSMENT  Assessment/Changes in Function:     Left pain free []  See Progress Note/Recertification   Patient will continue to benefit from skilled PT services to modify and progress therapeutic interventions, address functional mobility deficits, address ROM deficits, address strength deficits, analyze and address soft tissue restrictions, analyze and cue movement patterns, analyze and modify body mechanics/ergonomics, assess and modify postural abnormalities, and instruct in home and community integration to attain remaining goals. Progress toward goals / Updated goals:    Progress with pain goals      PLAN  []  Upgrade activities as tolerated yes Continue plan of care   []  Discharge due to :    []  Other:      Therapist: Ben Hurtado, PT    Date: 10/5/2022 Time: 12:01 PM     No future appointments.

## 2022-10-26 ENCOUNTER — HOSPITAL ENCOUNTER (OUTPATIENT)
Dept: PHYSICAL THERAPY | Age: 52
Discharge: HOME | End: 2022-10-26
Payer: COMMERCIAL

## 2022-10-26 PROCEDURE — 97110 THERAPEUTIC EXERCISES: CPT | Performed by: PHYSICAL THERAPIST

## 2022-10-26 PROCEDURE — 97140 MANUAL THERAPY 1/> REGIONS: CPT | Performed by: PHYSICAL THERAPIST

## 2022-10-26 NOTE — PROGRESS NOTES
3. PHYSICAL THERAPY - DAILY TREATMENT NOTE    Patient Name: Anna Langston        Date: 10/26/2022  : 1970   yes Patient  Verified  Visit #:   5  of   9  Insurance: Payor: Elan Glez / Plan: [address] [place] [address] / Product Type: PPO /      In time: 400 Out time: 455   Total Treatment Time: 55     Medicare/BCBS Time Tracking (below)   Total Timed Codes (min):  55 1:1 Treatment Time:  55     TREATMENT AREA =  Pain in right shoulder [M25.511]    SUBJECTIVE  Pain Level (on 0 to 10 scale):  0  / 10   Medication Changes/New allergies or changes in medical history, any new surgeries or procedures?    no  If yes, update Summary List   Subjective Functional Status/Changes:  []  No changes reported   See pn       OBJECTIVE           30 min Therapeutic Exercise:  [x]  See flow sheet   Rationale:      increase ROM and increase strength to improve the patients ability to complete adls     25 min Manual Therapy: C/s mobs, b first rib depression, posterior cuff release, ghj prom all directions B, ts mobs   Rationale:      decrease pain, increase ROM, and increase tissue extensibility to improve patient's ability to reach  The manual therapy interventions were performed at a separate and distinct time from the therapeutic activities interventions.       Billed With/As:   [] TE   [] TA   [] Neuro   [] Self Care Patient Education: [x] Review HEP    [] Progressed/Changed HEP based on:   [] positioning   [] body mechanics   [] transfers   [] heat/ice application    [] other:      Other Objective/Functional Measures:  See pn   Post Treatment Pain Level (on 0 to 10) scale:   0  / 10     ASSESSMENT  Assessment/Changes in Function:     See pn     []  See Progress Note/Recertification   Patient will continue to benefit from skilled PT services to modify and progress therapeutic interventions, address functional mobility deficits, address ROM deficits, address strength deficits, analyze and address soft tissue restrictions, analyze and cue movement patterns, analyze and modify body mechanics/ergonomics, assess and modify postural abnormalities, and instruct in home and community integration to attain remaining goals.    Progress toward goals / Updated goals:    See pn     PLAN  []  Upgrade activities as tolerated yes Continue plan of care   []  Discharge due to :    []  Other:      Therapist: Guillermo Andesren PT    Date: 10/26/2022 Time: 12:01 PM     Future Appointments   Date Time Provider Ki Rudd   10/31/2022 11:35 AM Shahana Amos, PT [facility]   11/2/2022 11:35 AM Shahana Amos, PT [facility]   11/7/2022 11:35 AM Shahana Dandre, PT [facility]   11/16/2022 11:35 AM Shahana Dandre, PT [facility]   11/18/2022 10:20 AM Shahana Amos, PT [facility]   11/21/2022 11:35 AM Tg Jeffers, PT [facility]

## 2022-10-31 ENCOUNTER — HOSPITAL ENCOUNTER (OUTPATIENT)
Dept: PHYSICAL THERAPY | Age: 52
Discharge: HOME | End: 2022-10-31
Payer: COMMERCIAL

## 2022-10-31 PROCEDURE — 97110 THERAPEUTIC EXERCISES: CPT | Performed by: PHYSICAL THERAPIST

## 2022-10-31 PROCEDURE — 97140 MANUAL THERAPY 1/> REGIONS: CPT | Performed by: PHYSICAL THERAPIST

## 2022-10-31 NOTE — THERAPY RECERTIFICATION
.[facility]  [address],[facility], [facility] - Phone: [phone]  Fax: [phone]  PROGRESS NOTE  Patient Name: Marleni Veloz : 1970   Treatment/Medical Diagnosis: Pain in right shoulder [M25.511]   Referral Source: Madonna Gates MD     Date of Initial Visit: 22 Attended Visits: 5 Missed Visits: See below     SUMMARY OF TREATMENT  Pt seen for IE and 4 follow up visits with treatment consisting of therapeutic exercises, manual therapy and instruction on hep/activity modification   CURRENT STATUS  Pt made excellent progress with her shoulders. Rates pain 3/10 at worst with lifting R>L. However she has restore full arom all directions without compensation. She still has limited thoracic mobility (ext 50%, L rot 75%) and reduced jairo-scapular strength (3/5). She did have a lapse in care due to lumbar MACK. This has significantly helped with her low back pain - not necessarily her legs. I would like to add in core stability exercises now that her pain is better    Goal/Measure of Progress Goal Met? 1. Note daily max pain </=1/10 in order to increase participation in adls   Status at last Eval: 6/10 Current Status: 3/10 progressing   2. Increase FOTO by 11 points in order to show functional improvement   Status at last Eval: 56/100 Current Status: Pt did not fill out n/a   3. Increase FIR to t11 to A with dressing   Status at last Eval: l5 Current Status: t8 yes     New Goals to be achieved in __4__  weeks:  1. Achieve goal #1  2. Achieve goal#2  3. +5 on GROC in order to show functional improvement   RECOMMENDATIONS  Continue therapy an additional 2x/4 weeks to include core stability   If you have any questions/comments please contact us directly at [phone]. Thank you for allowing us to assist in the care of your patient.     Therapist Signature: John Godoy PT Date: 10/31/2022     Time: 2:17 PM   NOTE TO PHYSICIAN:  PLEASE COMPLETE THE ORDERS BELOW AND FAX TO   [facility]: [phone]  If you are unable to process this request in 24 hours please contact our office: [phone]    ___ I have read the above report and request that my patient continue as recommended.   ___ I have read the above report and request that my patient continue therapy with the following changes/special instructions:_________________________________________________________   ___ I have read the above report and request that my patient be discharged from therapy.      Physician Signature:        Date:       Time:                                 Lydia Castano MD

## 2022-10-31 NOTE — PROGRESS NOTES
3. PHYSICAL THERAPY - DAILY TREATMENT NOTE    Patient Name: Isabella Retana        Date: 10/31/2022  : 1970   yes Patient  Verified  Visit #:   6  of   9  Insurance: Payor: Jillian Rizzo / Plan: [address] [place] [address] / Product Type: PPO /      In time: 1135 Out time: 5000   Total Treatment Time: 60     Medicare/BCBS Time Tracking (below)   Total Timed Codes (min):  60 1:1 Treatment Time:  60     TREATMENT AREA =  Pain in right shoulder [M25.511]    SUBJECTIVE  Pain Level (on 0 to 10 scale):  0  / 10   Medication Changes/New allergies or changes in medical history, any new surgeries or procedures?    no  If yes, update Summary List   Subjective Functional Status/Changes:  []  No changes reported   Feeling good - went to a wedding over the weekend and I was able to stand for a couple hours which I was surprised I could do        OBJECTIVE           30 min Therapeutic Exercise:  [x]  See flow sheet   Rationale:      increase ROM and increase strength to improve the patients ability to complete adls     30 min Manual Therapy: C/s mobs, b first rib depression, posterior cuff release, ghj prom all directions B, ts mobs   Rationale:      decrease pain, increase ROM, and increase tissue extensibility to improve patient's ability to reach  The manual therapy interventions were performed at a separate and distinct time from the therapeutic activities interventions.       Billed With/As:   [] TE   [] TA   [] Neuro   [] Self Care Patient Education: [x] Review HEP    [] Progressed/Changed HEP based on:   [] positioning   [] body mechanics   [] transfers   [] heat/ice application    [] other:      Other Objective/Functional Measures:  Progressed te as per flow sheet - challenged with increase of blue band exercises - ut compensation vs posterior mm     Post Treatment Pain Level (on 0 to 10) scale:   0  / 10     ASSESSMENT  Assessment/Changes in Function:   No increase in pain following session      []  See Progress Note/Recertification   Patient will continue to benefit from skilled PT services to modify and progress therapeutic interventions, address functional mobility deficits, address ROM deficits, address strength deficits, analyze and address soft tissue restrictions, analyze and cue movement patterns, analyze and modify body mechanics/ergonomics, assess and modify postural abnormalities, and instruct in home and community integration to attain remaining goals. Progress toward goals / Updated goals:     Added in new exercises to address newly established goals      PLAN  []  Upgrade activities as tolerated yes Continue plan of care   []  Discharge due to :    []  Other:      Therapist: Renata Vera, PT    Date: 10/31/2022 Time: 12:01 PM     Future Appointments   Date Time Provider Ki Rudd   10/31/2022 11:35 AM Duyen Townsend, PT [facility]   11/2/2022 11:35 AM Duyen Townsend, PT [facility]   11/7/2022 11:35 AM Duyen Townsend, PT [facility]   11/16/2022 11:35 AM Duyen Townsend, PT [facility]   11/18/2022 10:20 AM Duyen Townsend, PT [facility]   11/21/2022 11:35 AM Ailin Jeffers, PT [facility]

## 2022-11-07 ENCOUNTER — HOSPITAL ENCOUNTER (OUTPATIENT)
Dept: PHYSICAL THERAPY | Age: 52
Discharge: HOME | End: 2022-11-07
Payer: COMMERCIAL

## 2022-11-07 PROCEDURE — 97110 THERAPEUTIC EXERCISES: CPT | Performed by: PHYSICAL THERAPIST

## 2022-11-07 PROCEDURE — 97140 MANUAL THERAPY 1/> REGIONS: CPT | Performed by: PHYSICAL THERAPIST

## 2022-11-07 NOTE — PROGRESS NOTES
3. PHYSICAL THERAPY - DAILY TREATMENT NOTE    Patient Name: Drake Baptiste        Date: 2022  : 1970   yes Patient  Verified  Visit #:     Insurance: Payor: Audra Fulton / Plan: [address] [place] [address] / Product Type: PPO /      In time: 1140 Out time: 1240   Total Treatment Time: 60     Medicare/[employer] Time Tracking (below)   Total Timed Codes (min):  60 1:1 Treatment Time:  60     TREATMENT AREA =  Pain in right shoulder [M25.511]    SUBJECTIVE  Pain Level (on 0 to 10 scale):  3  / 10   Medication Changes/New allergies or changes in medical history, any new surgeries or procedures?    no  If yes, update Summary List   Subjective Functional Status/Changes:  []  No changes reported     I did some cleaning over the weekend and that did my back in        OBJECTIVE           30 min Therapeutic Exercise:  [x]  See flow sheet   Rationale:      increase ROM and increase strength to improve the patients ability to complete adls     30 min Manual Therapy: T/s l/s mobs L hip mobs, paraspinal release    Rationale:      decrease pain, increase ROM, and increase tissue extensibility to improve patient's ability to reach  The manual therapy interventions were performed at a separate and distinct time from the therapeutic activities interventions.       Billed With/As:   [] TE   [] TA   [] Neuro   [] Self Care Patient Education: [x] Review HEP    [] Progressed/Changed HEP based on:   [] positioning   [] body mechanics   [] transfers   [] heat/ice application    [] other:      Other Objective/Functional Measures:  Progressed te as per flow sheet - good form with alt dead bugs indicating improved core stabilization  Audible cavitation with t/s mobs      Post Treatment Pain Level (on 0 to 10) scale:   0  / 10     ASSESSMENT  Assessment/Changes in Function:   No increase in pain followign session      []  See Progress Note/Recertification   Patient will continue to benefit from skilled PT services to modify and progress therapeutic interventions, address functional mobility deficits, address ROM deficits, address strength deficits, analyze and address soft tissue restrictions, analyze and cue movement patterns, analyze and modify body mechanics/ergonomics, assess and modify postural abnormalities, and instruct in home and community integration to attain remaining goals.    Progress toward goals / Updated goals:  Progressing current program per md consult        PLAN  []  Upgrade activities as tolerated yes Continue plan of care   []  Discharge due to :    []  Other:      Therapist: Saturnino Turcios PT    Date: 11/7/2022 Time: 12:01 PM     Future Appointments   Date Time Provider Ki Rudd   11/7/2022 11:35 AM Miguel Bergeron, PT [facility]   11/16/2022 11:35 AM Miguel Bergeron, PT [facility]   11/18/2022 10:20 AM Miguel Bergeron, PT [facility]   11/21/2022 11:35 AM Peter Jeffers, PT [facility]

## 2022-11-16 ENCOUNTER — HOSPITAL ENCOUNTER (OUTPATIENT)
Dept: PHYSICAL THERAPY | Age: 52
Discharge: HOME | End: 2022-11-16
Payer: COMMERCIAL

## 2022-11-16 PROCEDURE — 97140 MANUAL THERAPY 1/> REGIONS: CPT | Performed by: PHYSICAL THERAPIST

## 2022-11-16 PROCEDURE — 97110 THERAPEUTIC EXERCISES: CPT | Performed by: PHYSICAL THERAPIST

## 2022-11-16 NOTE — PROGRESS NOTES
3. PHYSICAL THERAPY - DAILY TREATMENT NOTE    Patient Name: Dalton Cunningham        Date: 2022  : 1970   yes Patient  Verified  Visit #:     Insurance: Payor: Nirav Loco / Plan: [address] [place] [address] / Product Type: PPO /      In time: 9073 Out time: 122   Total Treatment Time: 50     Medicare/[employer] Time Tracking (below)   Total Timed Codes (min):  50 1:1 Treatment Time:  50     TREATMENT AREA =  Pain in right shoulder [M25.511]    SUBJECTIVE  Pain Level (on 0 to 10 scale):  3   10   Medication Changes/New allergies or changes in medical history, any new surgeries or procedures?    no  If yes, update Summary List   Subjective Functional Status/Changes:  []  No changes reported     I just twisted in bed and felt some discomfort. OBJECTIVE           25 min Therapeutic Exercise:  [x]  See flow sheet   Rationale:      increase ROM and increase strength to improve the patients ability to complete adls     25 min Manual Therapy: T/s l/s mobs L hip mobs, paraspinal release , si correct, l1 rr correction    Rationale:      decrease pain, increase ROM, and increase tissue extensibility to improve patient's ability to reach  The manual therapy interventions were performed at a separate and distinct time from the therapeutic activities interventions.       Billed With/As:   [] TE   [] TA   [] Neuro   [] Self Care Patient Education: [x] Review HEP    [] Progressed/Changed HEP based on:   [] positioning   [] body mechanics   [] transfers   [] heat/ice application    [] other:      Other Objective/Functional Measures:  Pt arrived with both tl and lumbosacralrotation on R, ttp along R lateral border of sacrum  Te asp per flow sheet      Post Treatment Pain Level (on 0 to 10) scale:   0  / 10     ASSESSMENT  Assessment/Changes in Function:   No increase in pain followign session      []  See Progress Note/Recertification   Patient will continue to benefit from skilled PT services to modify and progress therapeutic interventions, address functional mobility deficits, address ROM deficits, address strength deficits, analyze and address soft tissue restrictions, analyze and cue movement patterns, analyze and modify body mechanics/ergonomics, assess and modify postural abnormalities, and instruct in home and community integration to attain remaining goals.    Progress toward goals / Updated goals:  Progressing current program per md consult        PLAN  []  Upgrade activities as tolerated yes Continue plan of care   []  Discharge due to :    []  Other:      Therapist: Francisco Villarreal PT    Date: 11/16/2022 Time: 12:01 PM     Future Appointments   Date Time Provider Ki Rudd   11/21/2022 11:35 AM Caro Jeffers, PT [id number]

## 2022-11-18 ENCOUNTER — APPOINTMENT (OUTPATIENT)
Dept: PHYSICAL THERAPY | Age: 52
End: 2022-11-18
Payer: COMMERCIAL

## 2022-12-12 NOTE — THERAPY DISCHARGE
.[facility] [address] [address] [facility]ER[address]Y  [address] [place] [address] 201,[facility], [facility] - Phone: [phone]  Fax: [phone]  DISCHARGE  NOTE  Patient Name: Layne Yañez : 1970   Treatment/Medical Diagnosis: Pain in right shoulder [M25.511]   Referral Source: Maxine Solis MD     Date of Initial Visit: 22 Attended Visits: 5 Missed Visits: See below     SUMMARY OF TREATMENT  Pt seen for IE and 4 follow up visits with treatment consisting of therapeutic exercises, manual therapy and instruction on hep/activity modification   CURRENT STATUS  After lengthy discussion opted to d/c from therapy and attend personal training sessions. Goal/Measure of Progress Goal Met? 1. Note daily max pain </=/10 in order to increase participation in adls   Status at last Eval: 3/10 Current Status: 3/10 no   2. Increase FOTO by 11 points in order to show functional improvement   Status at last Eval: 56 Current Status: Pt did not fill out n/a   3.  +5 on GROC in order to show functional improvement    Status at last Eval: Baseline Current Status: +5 yes       RECOMMENDATIONS  D/c with advanced hep to a personal training   If you have any questions/comments please contact us directly at [phone]. Thank you for allowing us to assist in the care of your patient.     Therapist Signature: Russel Poole PT Date: 2022     Time: 2:17 PM   NOTE TO PHYSICIAN:  PLEASE COMPLETE THE ORDERS BELOW AND FAX TO   [facility]: [phone]  If you are unable to process this request in 24 hours please contact our office: [phone]    ___ I have read the above report and request that my patient continue as recommended.   ___ I have read the above report and request that my patient continue therapy with the following changes/special instructions:_________________________________________________________   ___ I have read the above report and request that my patient be discharged from therapy.      Physician Signature:        Date:       Time:                                 Sean Berg MD

## 2022-12-16 ENCOUNTER — HOSPITAL ENCOUNTER (OUTPATIENT)
Dept: PHYSICAL THERAPY | Age: 52
Discharge: HOME | End: 2022-12-16
Payer: COMMERCIAL

## 2022-12-16 PROCEDURE — 97140 MANUAL THERAPY 1/> REGIONS: CPT | Performed by: PHYSICAL THERAPIST

## 2022-12-16 NOTE — PROGRESS NOTES
3. PHYSICAL THERAPY - DAILY TREATMENT NOTE    Patient Name: Trung Brambila        Date: 2022  : 1970   yes Patient  Verified  Visit #:     Insurance: Payor: Samra Hussein / Plan: [address] [place] [address] / Product Type: PPO /      In time: 1115 Out time: 1140   Total Treatment Time: 25     Medicare/[facility] Time Tracking (below)   Total Timed Codes (min):  25 1:1 Treatment Time:  25     TREATMENT AREA =  Pain in right shoulder [M25.511]    SUBJECTIVE  Pain Level (on 0 to 10 scale):  3  / 10   Medication Changes/New allergies or changes in medical history, any new surgeries or procedures?    no  If yes, update Summary List   Subjective Functional Status/Changes:  []  No changes reported     See pn       OBJECTIVE           25 min Therapeutic Exercise:  [x]  See flow sheet   Rationale:      increase ROM and increase strength to improve the patients ability to complete adls     25 min Manual Therapy: T/s l/s mobs L hip mobs, paraspinal release , si correct, l1 rr correction    Rationale:      decrease pain, increase ROM, and increase tissue extensibility to improve patient's ability to reach  The manual therapy interventions were performed at a separate and distinct time from the therapeutic activities interventions.       Billed With/As:   [] TE   [] TA   [] Neuro   [] Self Care Patient Education: [x] Review HEP    [] Progressed/Changed HEP based on:   [] positioning   [] body mechanics   [] transfers   [] heat/ice application    [] other:      Other Objective/Functional Measures:  See pn     Post Treatment Pain Level (on 0 to 10) scale:   0  / 10     ASSESSMENT  Assessment/Changes in Function:   See pn     []  See Progress Note/Recertification   Patient will continue to benefit from skilled PT services to modify and progress therapeutic interventions, address functional mobility deficits, address ROM deficits, address strength deficits, analyze and address soft tissue restrictions, analyze and cue movement patterns, analyze and modify body mechanics/ergonomics, assess and modify postural abnormalities, and instruct in home and community integration to attain remaining goals.    Progress toward goals / Updated goals:  See pn       PLAN  []  Upgrade activities as tolerated yes Continue plan of care   []  Discharge due to :    []  Other:      Therapist: Maral Mclaughlin, PT    Date: 12/16/2022 Time: 12:01 PM     Future Appointments   Date Time Provider Ki Rudd   12/20/2022  1:45 PM Gustavo Ybarra, PT [facility]   1/12/2023  2:00 PM Gustavo Ybarra PT [facility]   1/18/2023 11:00 AM [facility]   1/25/2023 11:00 AM Stacey Jeffers, PT [address] [phone]

## 2022-12-20 ENCOUNTER — HOSPITAL ENCOUNTER (OUTPATIENT)
Dept: PHYSICAL THERAPY | Age: 52
Discharge: HOME | End: 2022-12-20
Payer: COMMERCIAL

## 2022-12-20 PROCEDURE — 97140 MANUAL THERAPY 1/> REGIONS: CPT | Performed by: PHYSICAL THERAPIST

## 2022-12-20 NOTE — THERAPY RECERTIFICATION
.[facility] [address] THERAPY  [address] [place], [place] [address],[facility], [facility] - Phone: [phone]  Fax: [phone]  PROGRESS NOTE  Patient Name: Marizol Lares : 1970   Treatment/Medical Diagnosis: Pain in right shoulder [M25.511], LBP   Referral Source: Anta Almeida MD     Date of Initial Visit: 22 Attended Visits: 8 Missed Visits: See below     SUMMARY OF TREATMENT  Pt seen for IE and 5 follow up visits with treatment consisting of therapeutic exercises, manual therapy and instruction on hep/activity modification   CURRENT STATUS  Pt attempted self management over the last month with use of . While she is able to complete all upper body exercise and adls without c/o her low back continues to be an issue. She attempted various lifts but unable to perform correctly. She does not have increase in her leg sx but thoracic and lumbar are continuing. I would like to continue 1/week with explicit instructions on mechanics form before performing I    Goal/Measure of Progress Goal Met? 1. Note daily max pain </=1/10 in order to increase participation in adls   Status at last Eval: 3/10 Current Status: 3/10 progressing   2. Increase FOTO by 11points in order to show functional improvement   Status at last Eval: 56/100 Current Status: Pt did not fill out n/a   3.  +5 on GROC in order to show functional improvement    Status at last Eval: na Current Status: With shoulder but no back progressing     New Goals to be achieved in __4__  weeks:  1. Achieve goal #1  2. Achieve goal#2  3. Achievegoal#3  RECOMMENDATIONS  Continue therapy an additional 1x/4 weeks to include core stability   If you have any questions/comments please contact us directly at [phone]. Thank you for allowing us to assist in the care of your patient.     Therapist Signature: Lisha Seth, PT Date: 2022     Time: 2:17 PM   NOTE TO PHYSICIAN:  PLEASE COMPLETE THE ORDERS BELOW AND FAX TO   [facility]: [phone]  If you are unable to process this request in 24 hours please contact our office: [phone]    ___ I have read the above report and request that my patient continue as recommended.   ___ I have read the above report and request that my patient continue therapy with the following changes/special instructions:_________________________________________________________   ___ I have read the above report and request that my patient be discharged from therapy.      Physician Signature:        Date:       Time:                                 Violette Murphy MD

## 2022-12-20 NOTE — PROGRESS NOTES
3. PHYSICAL THERAPY - DAILY TREATMENT NOTE    Patient Name: Isablela Retana        Date: 2022  : 1970   yes Patient  Verified  Visit #:   10  of   12  Insurance: Payor: Jillian Rizzo / Plan: [address] [place] [address] / Product Type: PPO /      In time: 150 Out time: 235   Total Treatment Time: 45     Medicare/[employer] Time Tracking (below)   Total Timed Codes (min):  45 1:1 Treatment Time:  45     TREATMENT AREA =  Pain in right shoulder [M25.511]    SUBJECTIVE  Pain Level (on 0 to 10 scale):  4  / 10   Medication Changes/New allergies or changes in medical history, any new surgeries or procedures?    no  If yes, update Summary List   Subjective Functional Status/Changes:  []  No changes reported        I did something after hte training yesterday because my back is really bothering me      OBJECTIVE             45 min Manual Therapy: T/s l/s mobs L hip mobs, paraspinal release , si correct, l1 rr correction, psoas, rf/tfl stretch   Rationale:      decrease pain, increase ROM, and increase tissue extensibility to improve patient's ability to reach  The manual therapy interventions were performed at a separate and distinct time from the therapeutic activities interventions.       Billed With/As:   [] TE   [] TA   [] Neuro   [] Self Care Patient Education: [x] Review HEP    [] Progressed/Changed HEP based on:   [] positioning   [] body mechanics   [] transfers   [] heat/ice application    [] other:      Other Objective/Functional Measures:  Significant tenderness and pain along B l5/s1 L>R  Increased time to perform all mobility with rotation most painful     Post Treatment Pain Level (on 0 to 10) scale:   1  / 10     ASSESSMENT  Assessment/Changes in Function:   Left with reduction in pain but no painf ree      []  See Progress Note/Recertification   Patient will continue to benefit from skilled PT services to modify and progress therapeutic interventions, address functional mobility deficits, address ROM deficits, address strength deficits, analyze and address soft tissue restrictions, analyze and cue movement patterns, analyze and modify body mechanics/ergonomics, assess and modify postural abnormalities, and instruct in home and community integration to attain remaining goals.    Progress toward goals / Updated goals:    Pt to go out of town until mid January - will resume therapy accordingly      PLAN  []  Upgrade activities as tolerated yes Continue plan of care   []  Discharge due to :    []  Other:      Therapist: Guillermo Andersen PT    Date: 12/20/2022 Time: 12:01 PM     Future Appointments   Date Time Provider Ki Rudd   12/20/2022  1:45 PM Shahana Amos, PT [facility]   1/12/2023  2:00 PM Shahana Amos, PT [facility]   1/18/2023 11:00 AM Shahana Amos, PT [facility]   1/25/2023 11:00 AM Tg Jeffers, PT [address] [phone]

## 2023-01-12 ENCOUNTER — APPOINTMENT (OUTPATIENT)
Dept: PHYSICAL THERAPY | Age: 53
End: 2023-01-12
Payer: COMMERCIAL

## 2023-01-18 ENCOUNTER — HOSPITAL ENCOUNTER (OUTPATIENT)
Dept: PHYSICAL THERAPY | Age: 53
Discharge: HOME | End: 2023-01-18
Payer: COMMERCIAL

## 2023-01-24 ENCOUNTER — HOSPITAL ENCOUNTER (OUTPATIENT)
Dept: PHYSICAL THERAPY | Age: 53
Discharge: HOME | End: 2023-01-24
Payer: COMMERCIAL

## 2023-01-24 PROCEDURE — 97140 MANUAL THERAPY 1/> REGIONS: CPT | Performed by: PHYSICAL THERAPIST

## 2023-01-24 NOTE — PROGRESS NOTES
3. PHYSICAL THERAPY - DAILY TREATMENT NOTE    Patient Name: Isabel Jay        Date: 2023  : 1970   yes Patient  Verified  Visit #:     Insurance: Payor: Joanie Saavedra / Plan: [address] [place] [address] / Product Type: PPO /      In time: 1210 Out time:    Total Treatment Time: 45     Medicare/[employer] Time Tracking (below)   Total Timed Codes (min):  45 1:1 Treatment Time:  45     TREATMENT AREA =  Pain in right shoulder [M25.511]    SUBJECTIVE  Pain Level (on 0 to 10 scale):  4  / 10   Medication Changes/New allergies or changes in medical history, any new surgeries or procedures?    no  If yes, update Summary List   Subjective Functional Status/Changes:  []  No changes reported        I did something after hte training yesterday because my back is really bothering me      OBJECTIVE             45 min Manual Therapy: T/s l/s mobs L hip mobs, paraspinal release , si correct, l1 rr correction, psoas, rf/tfl stretch   Rationale:      decrease pain, increase ROM, and increase tissue extensibility to improve patient's ability to reach  The manual therapy interventions were performed at a separate and distinct time from the therapeutic activities interventions.       Billed With/As:   [] TE   [] TA   [] Neuro   [] Self Care Patient Education: [x] Review HEP    [] Progressed/Changed HEP based on:   [] positioning   [] body mechanics   [] transfers   [] heat/ice application    [] other:      Other Objective/Functional Measures:  Significant tenderness and pain along B l5/s1 L>R  Increased time to perform all mobility with rotation most painful     Post Treatment Pain Level (on 0 to 10) scale:   1  / 10     ASSESSMENT  Assessment/Changes in Function:   Left with reduction in pain but no painf ree      []  See Progress Note/Recertification   Patient will continue to benefit from skilled PT services to modify and progress therapeutic interventions, address functional mobility deficits, address ROM deficits, address strength deficits, analyze and address soft tissue restrictions, analyze and cue movement patterns, analyze and modify body mechanics/ergonomics, assess and modify postural abnormalities, and instruct in home and community integration to attain remaining goals.    Progress toward goals / Updated goals:  Might dc next week     PLAN  []  Upgrade activities as tolerated yes Continue plan of care   []  Discharge due to :    []  Other:      Therapist: Rogelio Beckham PT    Date: 1/24/2023 Time: 12:01 PM     Future Appointments   Date Time Provider Ki Rudd   1/24/2023  2:00 PM Siria Crystal,  [address] [facility]   1/25/2023 11:00 AM Ayad Jeffers, PT [address] [address]

## 2023-01-27 ENCOUNTER — HOSPITAL ENCOUNTER (OUTPATIENT)
Dept: PHYSICAL THERAPY | Age: 53
Discharge: HOME | End: 2023-01-27
Payer: COMMERCIAL

## 2023-01-27 PROCEDURE — 97140 MANUAL THERAPY 1/> REGIONS: CPT | Performed by: PHYSICAL THERAPIST

## 2023-01-27 NOTE — THERAPY RECERTIFICATION
.RADHA [address] [address] THERAPY  [address] [facility] 201,[facility], 70 [facility] - Phone: [phone]  Fax: [phone]  DISCHARGE NOTE  Patient Name: Rosaura Kaplan : 1970   Treatment/Medical Diagnosis: Pain in right shoulder [M25.511], LBP   Referral Source: Karl Huertas MD     Date of Initial Visit: 22 Attended Visits: 11 Missed Visits: See below     SUMMARY OF TREATMENT  Pt seen for IE and 10 follow up visits with treatment consisting of therapeutic exercises, manual therapy and instruction on hep/activity modification   CURRENT STATUS  Pt attempted self management over the last month with use of . ABLE TO DO SO    Goal/Measure of Progress Goal Met? 1. Note daily max pain </=1/10 in order to increase participation in adls   Status at last Eval: 3/10 Current Status: 0/10 YES   2. Increase FOTO by 11points in order to show functional improvement   Status at last Eval: 56/100 Current Status: Pt did not fill out n/a   3.  +5 on GROC in order to show functional improvement    Status at last Eval: na Current Status: +6 YES       RECOMMENDATIONS  D/c with hep  If you have any questions/comments please contact us directly at [phone]. Thank you for allowing us to assist in the care of your patient.     Therapist Signature: Parmjit Berg PT Date: 2023     Time: 2:17 PM   NOTE TO PHYSICIAN:  PLEASE COMPLETE THE ORDERS BELOW AND FAX TO   [facility]: [phone]  If you are unable to process this request in 24 hours please contact our office: [phone]    ___ I have read the above report and request that my patient continue as recommended.   ___ I have read the above report and request that my patient continue therapy with the following changes/special instructions:_________________________________________________________   ___ I have read the above report and request that my patient be discharged from therapy.      Physician Signature:        Date:       Time:                                 Shanika Wilks MD

## 2023-01-27 NOTE — PROGRESS NOTES
3. PHYSICAL THERAPY - DAILY TREATMENT NOTE    Patient Name: Rita Reyes        Date: 2023  : 1970   yes Patient  Verified  Visit #:     Insurance: Payor: Zandra Kelley / Plan: [address] [place] [address] / Product Type: PPO /      In time: 910 Out time: 940   Total Treatment Time: 25     Medicare/[facility] Time Tracking (below)   Total Timed Codes (min):  25 1:1 Treatment Time:  25     TREATMENT AREA =  Pain in right shoulder [M25.511]    SUBJECTIVE  Pain Level (on 0 to 10 scale):  0  / 10   Medication Changes/New allergies or changes in medical history, any new surgeries or procedures?    no  If yes, update Summary List   Subjective Functional Status/Changes:  []  No changes reported     See dc     OBJECTIVE             25 min Manual Therapy: T/s l/s mobs L hip mobs, paraspinal release , si correct, l1 rr correction, psoas, rf/tfl stretch   Rationale:      decrease pain, increase ROM, and increase tissue extensibility to improve patient's ability to reach  The manual therapy interventions were performed at a separate and distinct time from the therapeutic activities interventions. Billed With/As:   [] TE   [] TA   [] Neuro   [] Self Care Patient Education: [x] Review HEP    [] Progressed/Changed HEP based on:   [] positioning   [] body mechanics   [] transfers   [] heat/ice application    [] other:      Other Objective/Functional Measures:  See dc     Post Treatment Pain Level (on 0 to 10) scale:   0  / 10     ASSESSMENT  Assessment/Changes in Function:   See dc     []  See Progress Note/Recertification   Patient will continue to benefit from skilled PT services to see dc   Progress toward goals / Updated goals:  See dc     PLAN  []  Upgrade activities as tolerated no Continue plan of care   []  Discharge due to :    []  Other:      Therapist: Maral Mclaughlin, PT    Date: 2023 Time: 12:01 PM     No future appointments.